# Patient Record
Sex: FEMALE | Race: WHITE | NOT HISPANIC OR LATINO
[De-identification: names, ages, dates, MRNs, and addresses within clinical notes are randomized per-mention and may not be internally consistent; named-entity substitution may affect disease eponyms.]

---

## 2020-01-09 ENCOUNTER — APPOINTMENT (OUTPATIENT)
Dept: CARDIOLOGY | Facility: CLINIC | Age: 75
End: 2020-01-09
Payer: MEDICARE

## 2020-01-09 ENCOUNTER — NON-APPOINTMENT (OUTPATIENT)
Age: 75
End: 2020-01-09

## 2020-01-09 VITALS
SYSTOLIC BLOOD PRESSURE: 169 MMHG | BODY MASS INDEX: 28.34 KG/M2 | HEIGHT: 62 IN | OXYGEN SATURATION: 97 % | WEIGHT: 154 LBS | DIASTOLIC BLOOD PRESSURE: 78 MMHG | HEART RATE: 73 BPM | RESPIRATION RATE: 14 BRPM

## 2020-01-09 DIAGNOSIS — Z83.79 FAMILY HISTORY OF OTHER DISEASES OF THE DIGESTIVE SYSTEM: ICD-10-CM

## 2020-01-09 DIAGNOSIS — Z82.49 FAMILY HISTORY OF ISCHEMIC HEART DISEASE AND OTHER DISEASES OF THE CIRCULATORY SYSTEM: ICD-10-CM

## 2020-01-09 DIAGNOSIS — Z00.00 ENCOUNTER FOR GENERAL ADULT MEDICAL EXAMINATION W/OUT ABNORMAL FINDINGS: ICD-10-CM

## 2020-01-09 DIAGNOSIS — Z78.9 OTHER SPECIFIED HEALTH STATUS: ICD-10-CM

## 2020-01-09 DIAGNOSIS — Z87.891 PERSONAL HISTORY OF NICOTINE DEPENDENCE: ICD-10-CM

## 2020-01-09 DIAGNOSIS — Z80.8 FAMILY HISTORY OF MALIGNANT NEOPLASM OF OTHER ORGANS OR SYSTEMS: ICD-10-CM

## 2020-01-09 PROCEDURE — 36415 COLL VENOUS BLD VENIPUNCTURE: CPT

## 2020-01-09 PROCEDURE — 99205 OFFICE O/P NEW HI 60 MIN: CPT

## 2020-01-09 PROCEDURE — 93000 ELECTROCARDIOGRAM COMPLETE: CPT

## 2020-01-09 RX ORDER — ASPIRIN 81 MG/1
81 TABLET ORAL
Refills: 0 | Status: ACTIVE | COMMUNITY

## 2020-01-12 LAB
CK SERPL-CCNC: 77 U/L
T3 SERPL-MCNC: 94 NG/DL
TSH SERPL-ACNC: 2.42 UIU/ML

## 2020-01-13 NOTE — DISCUSSION/SUMMARY
[FreeTextEntry1] : \par Palpitations -work-up in the remote past demonstrated ventricular premature beats to be the cause.  No ectopy evident on EKG today; we will send her home with a Holter monitor today to better assess her heart rhythm.\par We will arrange for an echocardiogram at her next office visit to arrange for valvular or myocardial abnormality.\par \par CAD, non-occlusive as seen on cardiac CTA at [place] on 12/31/19- 25-50% ostial LAD dz.; No plaque detected in L main, L Circ or RCA.  Will check lipid profile; will have her start ASA 81 mg qd as well as Lipitor.\par \par HLD - to start Lipitor 20 mg qd.\par \par Borderline BP - will monitor for now.\par \par Will return for a visit in 6 weeks; will arrange TTE at that time to assess for hypertensive changes or other structural disease.\par

## 2020-01-13 NOTE — REASON FOR VISIT
[FreeTextEntry1] : \par Zahida Valadez presents today for cardiac evaluation regarding palpitations and recently diagnosed non-occlusive CAD and HDL.

## 2020-01-13 NOTE — PHYSICAL EXAM
[General Appearance - Well Developed] : well developed [Normal Appearance] : normal appearance [Well Groomed] : well groomed [General Appearance - Well Nourished] : well nourished [No Deformities] : no deformities [General Appearance - In No Acute Distress] : no acute distress [Normal Conjunctiva] : the conjunctiva exhibited no abnormalities [Eyelids - No Xanthelasma] : the eyelids demonstrated no xanthelasmas [Normal Jugular Venous A Waves Present] : normal jugular venous A waves present [Normal Jugular Venous V Waves Present] : normal jugular venous V waves present [No Jugular Venous Gama A Waves] : no jugular venous gama A waves [Heart Rate And Rhythm] : heart rate and rhythm were normal [Respiration, Rhythm And Depth] : normal respiratory rhythm and effort [Auscultation Breath Sounds / Voice Sounds] : lungs were clear to auscultation bilaterally [Bowel Sounds] : normal bowel sounds [Abnormal Walk] : normal gait [Nail Clubbing] : no clubbing of the fingernails [Cyanosis, Localized] : no localized cyanosis [Skin Color & Pigmentation] : normal skin color and pigmentation [] : no rash [No Venous Stasis] : no venous stasis [Oriented To Time, Place, And Person] : oriented to person, place, and time [Impaired Insight] : insight and judgment were intact [Affect] : the affect was normal [Mood] : the mood was normal [FreeTextEntry1] : No bruit.  Soft, non-tender.  Liver and spleen not palpable; aortic pulsation not palpable.

## 2020-01-13 NOTE — ASSESSMENT
[FreeTextEntry1] : Palpitations\par \par CAD, non-occlusive.  Cardiac CTA at [place] on 12/31/19 showed 25-50% ostial LAD dz.; No plaque detected in L main, L Circ or RCA\par \par HLD\par \par Borderline BP

## 2020-01-13 NOTE — HISTORY OF PRESENT ILLNESS
[FreeTextEntry1] : She is 75 years old.  She reports the recent onset of palpitations, typically occurring as isolated premature beats, but occasionally occurring in clusters of 4 or 5 beats in a row.  These have been present over the past few weeks. Several years ago, she had similar symptoms, and she tells me that prior work-up showed that isolated ventricular premature beats were the cause.\par \par She was seen in the emergency room at [facility] on December 31 because of her palpitations.  Work-up at that time included a cardiac CT angiogram, which showed a 25-50% ostial LAD lesion but no other coronary plaque.  Calcific changes were also described in the thoracic aorta.  Blood work at that time demonstrated a total cholesterol of 240, with HDL of 83, calculated LDL cholesterol of 142 and triglycerides of 73.  Troponins were normal.  Blood counts and blood chemistries were otherwise unremarkable.  Hemoglobin was 13.7, blood sugar was 99, BUN and creatinine were 16 and 0.7.\par \par She does not describe exertional chest discomfort or dyspnea.  There have been no episodes of lightheadedness or loss of consciousness.\par \par There is no family history of premature heart disease.  She describes no history of diabetes or hypertension.  She has a remote history of cigarette use, which she discontinued in 1978; at the time, she smoked 1 pack/day for perhaps 10 years.

## 2020-01-17 ENCOUNTER — NON-APPOINTMENT (OUTPATIENT)
Age: 75
End: 2020-01-17

## 2020-03-03 ENCOUNTER — APPOINTMENT (OUTPATIENT)
Dept: CARDIOLOGY | Facility: CLINIC | Age: 75
End: 2020-03-03
Payer: MEDICARE

## 2020-03-03 ENCOUNTER — NON-APPOINTMENT (OUTPATIENT)
Age: 75
End: 2020-03-03

## 2020-03-03 VITALS
OXYGEN SATURATION: 100 % | HEART RATE: 51 BPM | SYSTOLIC BLOOD PRESSURE: 118 MMHG | DIASTOLIC BLOOD PRESSURE: 76 MMHG | TEMPERATURE: 98.1 F | WEIGHT: 149 LBS | HEIGHT: 62 IN | RESPIRATION RATE: 17 BRPM | BODY MASS INDEX: 27.42 KG/M2

## 2020-03-03 LAB
ALBUMIN SERPL ELPH-MCNC: 4.4 G/DL
ALP BLD-CCNC: 73 U/L
ALT SERPL-CCNC: 17 U/L
AST SERPL-CCNC: 20 U/L
BILIRUB DIRECT SERPL-MCNC: 0.2 MG/DL
BILIRUB INDIRECT SERPL-MCNC: 0.5 MG/DL
BILIRUB SERPL-MCNC: 0.7 MG/DL
CHOLEST SERPL-MCNC: 144 MG/DL
CHOLEST/HDLC SERPL: 1.7 RATIO
CK SERPL-CCNC: 76 U/L
HDLC SERPL-MCNC: 83 MG/DL
LDLC SERPL CALC-MCNC: 51 MG/DL
LDLC SERPL DIRECT ASSAY-MCNC: 57 MG/DL
PROT SERPL-MCNC: 6.4 G/DL
TRIGL SERPL-MCNC: 44 MG/DL

## 2020-03-03 PROCEDURE — 93000 ELECTROCARDIOGRAM COMPLETE: CPT

## 2020-03-03 PROCEDURE — 99214 OFFICE O/P EST MOD 30 MIN: CPT

## 2020-03-03 PROCEDURE — 36415 COLL VENOUS BLD VENIPUNCTURE: CPT

## 2020-03-03 PROCEDURE — 93306 TTE W/DOPPLER COMPLETE: CPT

## 2020-03-03 NOTE — HISTORY OF PRESENT ILLNESS
[FreeTextEntry1] : She reports recent palpitations, typically as isolated premature beats, but occasionally in clusters.  Years ago she had similar symptoms; prior work-up showed that isolated VPCs as the cause.\par \par She was seen in the ED at [facility] on December 31.  Cardiac CT angiogram showed a 25-50% ostial LAD lesion but no other coronary plaque.  Calcific changes were also described in the thoracic aorta.  Total cholesterol was 240, with HDL of 83, calculated LDL cholesterol of 142 and triglycerides of 73.  Troponins were normal.  Blood counts and blood chemistries were otherwise unremarkable.  Hemoglobin was 13.7, blood sugar was 99, BUN and creatinine were 16 and 0.7.\par \par At her last visit, she wore a Holter monitor.  The recording demonstrated 148 isolated ventricular premature beats, some occurring as ventricular bigeminy.  225 isolated atrial premature beats were seen as well as one atrial couplet and 1 5 beat run of atrial tachycardia.  Her symptoms seem to correlate to the ventricular premature beats and episodes of ventricular bigeminy.\par \par As she returns today, she is feeling better overall.  While she still experiences palpitations, they have been less frequent since I saw her in January.  There have been no episodes of exertional chest discomfort or dyspnea.  She reports no episodes of lightheadedness or loss of consciousness.\par \par At her last visit, we added Lipitor, which she is tolerating without problem.  She is also tolerating low-dose aspirin.

## 2020-03-03 NOTE — DISCUSSION/SUMMARY
[FreeTextEntry1] : \par Palpitations - correlate with isolated VPCs.  Echocardiogram shows no structural cardiac abnormality.  Reassured patient that this does not require therapy.  The only reason to use a medication would be for relief of symptoms, and fortunately her palpitations seem less troublesome at this time.  \par \par CAD, non-occlusive seen on cardiac CTA on 12/31/19 -We will check a lipid profile, to assess her response to atorvastatin, and also will check a hepatic profile and CPK level.  We will call her with results when available.  For the time being, she will continue the current dose of atorvastatin as well as ASA 81 mg qd.\par \par HLD - as above.\par \par BP in normal range on exam today. \par \par Will return for a visit in 4 months.

## 2020-03-03 NOTE — ASSESSMENT
MARYI- pt called and cancelled follow up appointment for tomorrow. She had Procedure: Right carotid Endarterectomy with Pericardial Patch Reconstruction completed on 6/28/19 and is still recovering.  Pt advised to call office back and schedule follow up appoi [FreeTextEntry1] : Palpitations, correlate with isolated VPCs.\par \par CAD, non-occlusive.  Cardiac CTA at [place] on 12/31/19 showed 25-50% ostial LAD dz.; no plaque detected in L main, L Circ or RCA\par \par HLD\par \par Borderline BP

## 2020-04-22 ENCOUNTER — RX RENEWAL (OUTPATIENT)
Age: 75
End: 2020-04-22

## 2020-07-09 ENCOUNTER — NON-APPOINTMENT (OUTPATIENT)
Age: 75
End: 2020-07-09

## 2020-07-09 ENCOUNTER — APPOINTMENT (OUTPATIENT)
Dept: CARDIOLOGY | Facility: CLINIC | Age: 75
End: 2020-07-09
Payer: MEDICARE

## 2020-07-09 VITALS
OXYGEN SATURATION: 100 % | TEMPERATURE: 98 F | DIASTOLIC BLOOD PRESSURE: 78 MMHG | SYSTOLIC BLOOD PRESSURE: 155 MMHG | HEART RATE: 66 BPM | WEIGHT: 145 LBS | HEIGHT: 62 IN | BODY MASS INDEX: 26.68 KG/M2

## 2020-07-09 PROCEDURE — 93000 ELECTROCARDIOGRAM COMPLETE: CPT

## 2020-07-09 PROCEDURE — 99214 OFFICE O/P EST MOD 30 MIN: CPT

## 2020-07-09 PROCEDURE — 36415 COLL VENOUS BLD VENIPUNCTURE: CPT

## 2020-07-09 NOTE — HISTORY OF PRESENT ILLNESS
[FreeTextEntry1] : She has suffered with palpitations, typically as isolated premature beats, but occasionally in clusters.  W/U years ago showed that isolated VPCs correlated with her sxs.\par \par She was seen in the ED at [facility] lst December.  Cardiac CTA showed a 25-50% ostial LAD lesion but no other dz.  Calcific changes were described in the thoracic aorta.  Total cholesterol was 240, with HDL of 83, calculated LDL cholesterol of 142 and triglycerides of 73.  Troponins were normal.  \par \par Earlier this year, a Holter monitor demonstrated 148 isolated ventricular premature beats, some occurring as ventricular bigeminy.  225 isolated atrial premature beats were seen as well as one atrial couplet and 1 5 beat run of atrial tachycardia.  Symptoms again seemed to correlate to the ventricular premature beats and episodes of ventricular bigeminy.\par \par Since I saw her last in early March, she has been well.  She reports only rare palps since her last visit.  She describes no episodes of lightheadedness or loss of consciousness.\par \par At her last visit, we added Lipitor, which she is tolerating without problem.  She is also tolerating low-dose aspirin.

## 2020-07-09 NOTE — ASSESSMENT
[FreeTextEntry1] : Palpitations, correlate with isolated VPCs.\par \par CAD, non-occlusive.  Cardiac CTA at [place] on 12/31/19 showed 25-50% ostial LAD dz.; no plaque detected in L main, L Circ or RCA\par \par HLD\par \par Borderline BP

## 2020-07-09 NOTE — PHYSICAL EXAM
[General Appearance - Well Developed] : well developed [Normal Appearance] : normal appearance [Well Groomed] : well groomed [General Appearance - Well Nourished] : well nourished [No Deformities] : no deformities [General Appearance - In No Acute Distress] : no acute distress [Normal Conjunctiva] : the conjunctiva exhibited no abnormalities [Eyelids - No Xanthelasma] : the eyelids demonstrated no xanthelasmas [Normal Jugular Venous A Waves Present] : normal jugular venous A waves present [Normal Jugular Venous V Waves Present] : normal jugular venous V waves present [No Jugular Venous Gama A Waves] : no jugular venous gama A waves [Respiration, Rhythm And Depth] : normal respiratory rhythm and effort [Auscultation Breath Sounds / Voice Sounds] : lungs were clear to auscultation bilaterally [Heart Rate And Rhythm] : heart rate and rhythm were normal [Bowel Sounds] : normal bowel sounds [Cyanosis, Localized] : no localized cyanosis [Nail Clubbing] : no clubbing of the fingernails [Abnormal Walk] : normal gait [Skin Color & Pigmentation] : normal skin color and pigmentation [No Venous Stasis] : no venous stasis [] : no rash [Impaired Insight] : insight and judgment were intact [Oriented To Time, Place, And Person] : oriented to person, place, and time [Mood] : the mood was normal [Affect] : the affect was normal [FreeTextEntry1] : No bruit.  Soft, non-tender.  Liver and spleen not palpable; aortic pulsation not palpable.

## 2020-07-09 NOTE — DISCUSSION/SUMMARY
[FreeTextEntry1] : \par Palpitations - correlated with isolated VPCs.  Echocardiogram showed no structural cardiac abnormality.   No significant sxs. of late.\par \par CAD, non-occlusive on cardiac CTA 12/2019 at [place] - continue ASA and statin.\par \par HLD - will recheck lipid and hepatic profile  For the time being, she will continue the current dose of atorvastatin, pending result.\par \par Systolic BP higher than normal today (was normal at last O.V.).  She will monitor periodically at home.  Will hold off on adding med at this time.\par \par Low salt and low fat diet. \par \par Will return for a visit in 3 months.

## 2020-07-10 LAB
ALBUMIN SERPL ELPH-MCNC: 4.6 G/DL
ALP BLD-CCNC: 97 U/L
ALT SERPL-CCNC: 39 U/L
AST SERPL-CCNC: 29 U/L
BILIRUB DIRECT SERPL-MCNC: 0.2 MG/DL
BILIRUB INDIRECT SERPL-MCNC: 0.6 MG/DL
BILIRUB SERPL-MCNC: 0.8 MG/DL
CHOLEST SERPL-MCNC: 176 MG/DL
CHOLEST/HDLC SERPL: 1.8 RATIO
HDLC SERPL-MCNC: 98 MG/DL
LDLC SERPL CALC-MCNC: 69 MG/DL
LDLC SERPL DIRECT ASSAY-MCNC: 75 MG/DL
PROT SERPL-MCNC: 6.9 G/DL
TRIGL SERPL-MCNC: 45 MG/DL

## 2020-09-25 ENCOUNTER — NON-APPOINTMENT (OUTPATIENT)
Age: 75
End: 2020-09-25

## 2020-09-25 ENCOUNTER — APPOINTMENT (OUTPATIENT)
Dept: CARDIOLOGY | Facility: CLINIC | Age: 75
End: 2020-09-25
Payer: MEDICARE

## 2020-09-25 VITALS
WEIGHT: 142 LBS | HEART RATE: 77 BPM | DIASTOLIC BLOOD PRESSURE: 88 MMHG | RESPIRATION RATE: 14 BRPM | BODY MASS INDEX: 25.97 KG/M2 | OXYGEN SATURATION: 99 % | SYSTOLIC BLOOD PRESSURE: 158 MMHG

## 2020-09-25 PROCEDURE — 99214 OFFICE O/P EST MOD 30 MIN: CPT

## 2020-09-25 PROCEDURE — 93000 ELECTROCARDIOGRAM COMPLETE: CPT

## 2020-09-25 NOTE — DISCUSSION/SUMMARY
[FreeTextEntry1] : \par Palpitations - correlated with isolated VPCs.  Echocardiogram showed no structural cardiac abnormality.   No significant sxs. of late.\par \par CAD, non-occlusive on cardiac CTA 12/2019 at [place]; remains asx., continue ASA and statin.\par \par HLD - lipid and hepatic profile in good range in July; continue atorvastatin.\par \par Systolic BP remains higher than ideal; appears to have systolic HTN of aging.  Will add olmesartan 5 mg. qd. \par \par Continue low fat and low salt diet.\par \par Ret. in 2 month for OV/BP recheck.

## 2020-09-25 NOTE — ASSESSMENT
[FreeTextEntry1] : Palpitations, correlate with isolated VPCs.\par \par CAD, non-occlusive.  Cardiac CTA at [place] on 12/31/19 showed 25-50% ostial LAD dz.; no plaque detected in L main, L Circ or RCA\par \par HLD\par \par HTN

## 2020-09-25 NOTE — PHYSICAL EXAM
[General Appearance - Well Developed] : well developed [Normal Appearance] : normal appearance [Well Groomed] : well groomed [General Appearance - Well Nourished] : well nourished [No Deformities] : no deformities [General Appearance - In No Acute Distress] : no acute distress [Normal Conjunctiva] : the conjunctiva exhibited no abnormalities [Eyelids - No Xanthelasma] : the eyelids demonstrated no xanthelasmas [Normal Jugular Venous A Waves Present] : normal jugular venous A waves present [Normal Jugular Venous V Waves Present] : normal jugular venous V waves present [No Jugular Venous Gama A Waves] : no jugular venous gama A waves [Respiration, Rhythm And Depth] : normal respiratory rhythm and effort [Auscultation Breath Sounds / Voice Sounds] : lungs were clear to auscultation bilaterally [Heart Rate And Rhythm] : heart rate and rhythm were normal [Bowel Sounds] : normal bowel sounds [Abnormal Walk] : normal gait [Nail Clubbing] : no clubbing of the fingernails [Cyanosis, Localized] : no localized cyanosis [Skin Color & Pigmentation] : normal skin color and pigmentation [] : no rash [No Venous Stasis] : no venous stasis [Oriented To Time, Place, And Person] : oriented to person, place, and time [Impaired Insight] : insight and judgment were intact [Affect] : the affect was normal [Mood] : the mood was normal [FreeTextEntry1] : No bruit.  Soft, non-tender.  Liver and spleen not palpable; aortic pulsation not palpable.

## 2020-09-25 NOTE — REASON FOR VISIT
[FreeTextEntry1] : \par Zahida Valadez returns regarding palpitations, non-occlusive CAD, HLD and borderline BP.

## 2020-09-28 ENCOUNTER — TRANSCRIPTION ENCOUNTER (OUTPATIENT)
Age: 75
End: 2020-09-28

## 2020-11-19 ENCOUNTER — RX RENEWAL (OUTPATIENT)
Age: 75
End: 2020-11-19

## 2020-11-24 ENCOUNTER — APPOINTMENT (OUTPATIENT)
Dept: CARDIOLOGY | Facility: CLINIC | Age: 75
End: 2020-11-24
Payer: MEDICARE

## 2020-11-24 ENCOUNTER — NON-APPOINTMENT (OUTPATIENT)
Age: 75
End: 2020-11-24

## 2020-11-24 VITALS
SYSTOLIC BLOOD PRESSURE: 140 MMHG | HEART RATE: 59 BPM | HEIGHT: 62 IN | TEMPERATURE: 98.1 F | DIASTOLIC BLOOD PRESSURE: 80 MMHG | RESPIRATION RATE: 14 BRPM | WEIGHT: 141 LBS | BODY MASS INDEX: 25.95 KG/M2

## 2020-11-24 LAB
ALBUMIN SERPL ELPH-MCNC: 4.4 G/DL
ALP BLD-CCNC: 80 U/L
ALT SERPL-CCNC: 16 U/L
ANION GAP SERPL CALC-SCNC: 11 MMOL/L
AST SERPL-CCNC: 18 U/L
BILIRUB SERPL-MCNC: 0.6 MG/DL
BUN SERPL-MCNC: 19 MG/DL
CALCIUM SERPL-MCNC: 9.2 MG/DL
CHLORIDE SERPL-SCNC: 104 MMOL/L
CHOLEST SERPL-MCNC: 171 MG/DL
CO2 SERPL-SCNC: 28 MMOL/L
CREAT SERPL-MCNC: 0.84 MG/DL
GLUCOSE SERPL-MCNC: 101 MG/DL
HDLC SERPL-MCNC: 98 MG/DL
LDLC SERPL CALC-MCNC: 63 MG/DL
LDLC SERPL DIRECT ASSAY-MCNC: 64 MG/DL
NONHDLC SERPL-MCNC: 72 MG/DL
POTASSIUM SERPL-SCNC: 4.4 MMOL/L
PROT SERPL-MCNC: 6.6 G/DL
SODIUM SERPL-SCNC: 143 MMOL/L
TRIGL SERPL-MCNC: 48 MG/DL

## 2020-11-24 PROCEDURE — 99214 OFFICE O/P EST MOD 30 MIN: CPT

## 2020-11-24 PROCEDURE — 93000 ELECTROCARDIOGRAM COMPLETE: CPT

## 2020-11-24 PROCEDURE — 36415 COLL VENOUS BLD VENIPUNCTURE: CPT

## 2020-11-24 NOTE — ASSESSMENT
[FreeTextEntry1] : Palpitations, correlate with isolated VPCs.\par \par CAD, non-occlusive.  Cardiac CTA at [place] on 12/31/19 showed 25-50% ostial LAD dz.; no plaque detected in L main, L Circ or RCA\par \par HLD\par \par HTN

## 2020-11-24 NOTE — DISCUSSION/SUMMARY
[FreeTextEntry1] : \par Systolic BP improved - continue olmesartan 5 mg. qd. \par \par Palpitations - correlate with isolated VPCs.  Echo showed no structural cardiac abnormality.   No significant sxs. of late.\par \par CAD, non-occlusive on cardiac CTA 12/2019 at [place]; remains asx., continue ASA and statin.\par \par HLD - lipid and hepatic profile in good range in July; continue atorvastatin.\par \par Continue low fat and low salt diet.\par \par Will check CMP and lipid panel.\par \par Ret. in 4 months for OV.

## 2020-11-24 NOTE — HISTORY OF PRESENT ILLNESS
[FreeTextEntry1] : She has suffered with palpitations for years.   When seen in the ED at [place] last December, cardiac CTA showed a 25-50% ostial LAD lesion but no other dz.  Calcific changes were described in the thoracic aorta.  Troponins were normal.  \par \par Earlier this year, a Holter monitor demonstrated 148 isolated ventricular premature beats, some as ventricular bigeminy.  225 isolated atrial premature beats were seen as well as one atrial couplet and one 5 beat run of atrial tachycardia.  Symptoms seemed to correlate to VPCs and  ventricular bigeminy.\par \par Since I saw her last in Sept., she remains well.  She reports no significant palps since her last visit.  She reports no episodes of lightheadedness or loss of consciousness. There have been no episodes of CP or BOLAND.\par \par She continues Lipitor which she is tolerating without problem as well as low-dose aspirin.  She is tolerating the recent addition of olmesartan without problem.

## 2020-12-23 ENCOUNTER — RX RENEWAL (OUTPATIENT)
Age: 75
End: 2020-12-23

## 2021-03-30 ENCOUNTER — NON-APPOINTMENT (OUTPATIENT)
Age: 76
End: 2021-03-30

## 2021-03-30 ENCOUNTER — APPOINTMENT (OUTPATIENT)
Dept: CARDIOLOGY | Facility: CLINIC | Age: 76
End: 2021-03-30
Payer: MEDICARE

## 2021-03-30 VITALS
WEIGHT: 142 LBS | OXYGEN SATURATION: 99 % | BODY MASS INDEX: 25.97 KG/M2 | HEART RATE: 60 BPM | DIASTOLIC BLOOD PRESSURE: 70 MMHG | SYSTOLIC BLOOD PRESSURE: 140 MMHG | TEMPERATURE: 97 F

## 2021-03-30 PROCEDURE — 99214 OFFICE O/P EST MOD 30 MIN: CPT

## 2021-03-30 PROCEDURE — 93000 ELECTROCARDIOGRAM COMPLETE: CPT

## 2021-03-30 PROCEDURE — 99072 ADDL SUPL MATRL&STAF TM PHE: CPT

## 2021-03-30 PROCEDURE — 36415 COLL VENOUS BLD VENIPUNCTURE: CPT

## 2021-03-30 NOTE — PHYSICAL EXAM
[General Appearance - Well Developed] : well developed [Normal Appearance] : normal appearance [Well Groomed] : well groomed [General Appearance - Well Nourished] : well nourished [No Deformities] : no deformities [General Appearance - In No Acute Distress] : no acute distress [Normal Conjunctiva] : the conjunctiva exhibited no abnormalities [Eyelids - No Xanthelasma] : the eyelids demonstrated no xanthelasmas [Normal Jugular Venous A Waves Present] : normal jugular venous A waves present [Normal Jugular Venous V Waves Present] : normal jugular venous V waves present [No Jugular Venous Gama A Waves] : no jugular venous gama A waves [Auscultation Breath Sounds / Voice Sounds] : lungs were clear to auscultation bilaterally [Respiration, Rhythm And Depth] : normal respiratory rhythm and effort [Heart Rate And Rhythm] : heart rate and rhythm were normal [Bowel Sounds] : normal bowel sounds [Abnormal Walk] : normal gait [Nail Clubbing] : no clubbing of the fingernails [Cyanosis, Localized] : no localized cyanosis [Skin Color & Pigmentation] : normal skin color and pigmentation [] : no rash [No Venous Stasis] : no venous stasis [Oriented To Time, Place, And Person] : oriented to person, place, and time [Impaired Insight] : insight and judgment were intact [Affect] : the affect was normal [Mood] : the mood was normal [FreeTextEntry1] : No bruit.  Soft, non-tender.  Liver and spleen not palpable; aortic pulsation not palpable.

## 2021-03-30 NOTE — DISCUSSION/SUMMARY
[FreeTextEntry1] : \par Systolic BP higher than idea; will increase olmesartan to 10 mg. qd. (two 5 mg tabs)\par \par HLD - continue atorvastatin. Will recheck LFTs and lipid panel.\par \par CAD, non-occlusive on cardiac CTA 12/2019 at [place]; remains asx., continue ASA and statin.\par \par Palpitations - correlate with isolated VPCs; echo showed no structural cardiac abnormality.   No significant sxs. of late.\par \par Continue low fat and low salt diet.\par \par Ret. in 4 months for OV.

## 2021-03-30 NOTE — ASSESSMENT
[FreeTextEntry1] : Palpitations, correlate with isolated VPCs.\par \par CAD, non-occlusive.  Cardiac CTA at [place] on 12/31/19 showed 25-50% ostial LAD dz.; no plaque detected in L main, L Circ or RCA\par \par HLD\par \par HTN

## 2021-03-30 NOTE — REASON FOR VISIT
[FreeTextEntry1] : \par Zahida Valadez returns regarding palpitations, non-occlusive CAD, HLD and HTN.

## 2021-03-30 NOTE — HISTORY OF PRESENT ILLNESS
[FreeTextEntry1] : She has suffered with palpitations for years.   When seen in the ED at [place] last December, cardiac CTA showed a 25-50% ostial LAD lesion but no other dz.  Calcific changes were described in the thoracic aorta.  Troponins were normal.  \par \par Earlier this year, a Holter monitor demonstrated 148 isolated ventricular premature beats, some as ventricular bigeminy.  225 isolated atrial premature beats were seen as well as one atrial couplet and one 5 beat run of atrial tachycardia.  Symptoms seemed to correlate to VPCs and  ventricular bigeminy.\par \par Since I saw her last in November, she has remained well.  There have been no significant palps since.  She describes no episodes of lightheadedness or loss of consciousness. There have been no episodes of CP or BOLAND.\par \par She is tolerating her meds without problem. There have been no new interval medical problems. She has received both Covid vaccinations.

## 2021-03-31 LAB
ALBUMIN SERPL ELPH-MCNC: 4.4 G/DL
ALP BLD-CCNC: 74 U/L
ALT SERPL-CCNC: 12 U/L
ANION GAP SERPL CALC-SCNC: 11 MMOL/L
AST SERPL-CCNC: 15 U/L
BILIRUB SERPL-MCNC: 0.6 MG/DL
BUN SERPL-MCNC: 16 MG/DL
CALCIUM SERPL-MCNC: 9.1 MG/DL
CHLORIDE SERPL-SCNC: 103 MMOL/L
CHOLEST SERPL-MCNC: 187 MG/DL
CO2 SERPL-SCNC: 27 MMOL/L
CREAT SERPL-MCNC: 0.8 MG/DL
GLUCOSE SERPL-MCNC: 93 MG/DL
HDLC SERPL-MCNC: 92 MG/DL
LDLC SERPL CALC-MCNC: 81 MG/DL
LDLC SERPL DIRECT ASSAY-MCNC: 81 MG/DL
NONHDLC SERPL-MCNC: 94 MG/DL
POTASSIUM SERPL-SCNC: 4.3 MMOL/L
PROT SERPL-MCNC: 6.5 G/DL
SODIUM SERPL-SCNC: 141 MMOL/L
TRIGL SERPL-MCNC: 68 MG/DL

## 2021-07-01 ENCOUNTER — RX RENEWAL (OUTPATIENT)
Age: 76
End: 2021-07-01

## 2021-07-27 ENCOUNTER — NON-APPOINTMENT (OUTPATIENT)
Age: 76
End: 2021-07-27

## 2021-07-27 ENCOUNTER — APPOINTMENT (OUTPATIENT)
Dept: CARDIOLOGY | Facility: CLINIC | Age: 76
End: 2021-07-27
Payer: MEDICARE

## 2021-07-27 VITALS
HEART RATE: 60 BPM | SYSTOLIC BLOOD PRESSURE: 132 MMHG | WEIGHT: 142 LBS | OXYGEN SATURATION: 99 % | DIASTOLIC BLOOD PRESSURE: 78 MMHG | BODY MASS INDEX: 26.13 KG/M2 | HEIGHT: 62 IN

## 2021-07-27 PROCEDURE — 93000 ELECTROCARDIOGRAM COMPLETE: CPT

## 2021-07-27 PROCEDURE — 99072 ADDL SUPL MATRL&STAF TM PHE: CPT

## 2021-07-27 PROCEDURE — 99214 OFFICE O/P EST MOD 30 MIN: CPT

## 2021-07-27 NOTE — DISCUSSION/SUMMARY
[FreeTextEntry1] : \par BP in acceptable range today; continue olmesartan 10 mg. qd. (two 5 mg tabs)\par \par HLD - continue atorvastatin. Sent new Rx to drug store.\par \par CAD, non-occlusive on cardiac CTA 12/2019 at [place].  Remains asx., continue ASA and statin.\par \par Palpitations - correlated with isolated VPCs; echo showed no structural cardiac abnormality.   No significant sxs. of late.\par \par Continue low fat and low salt diet.\par \par Ret. in 6 months for OV.

## 2021-07-27 NOTE — HISTORY OF PRESENT ILLNESS
[FreeTextEntry1] : She has suffered with palpitations for years.   In Dec. 2019, she went to the ED at [place] because of palps;  cardiac CTA showed a 25-50% ostial LAD lesion but no other dz.  Calcific changes were described in the thoracic aorta.  Troponins were normal.  Subsequent Holter monitor in early 2020 demonstrated 148 isolated ventricular premature beats, some as ventricular bigeminy.  225 isolated atrial premature beats were seen as well as one atrial couplet and one 5 beat run of atrial tachycardia.  Symptoms seemed to correlate to VPCs and  ventricular bigeminy.\par \par When I saw her last, I had her increase the dose of Benicar for better BP control.\par \par As she returns today, she has been feeling well.  She reports no significant palps since I saw her last.  There have been no episodes of lightheadedness or loss of consciousness. She describes no episodes of CP or BOLAND.\par \par She continues to tolerated her meds without problem. There have been no new interval medical problems.

## 2021-07-27 NOTE — ASSESSMENT
[FreeTextEntry1] : Palpitations, correlate with isolated VPCs.\par \par CAD, non-occlusive.  Cardiac CTA at [place] on 12/31/19 showed 25-50% ostial LAD dz.; no plaque detected in L main, L Circ or RCA\par \par HLD\par \par HTN

## 2022-01-12 ENCOUNTER — RX RENEWAL (OUTPATIENT)
Age: 77
End: 2022-01-12

## 2022-02-01 ENCOUNTER — APPOINTMENT (OUTPATIENT)
Dept: CARDIOLOGY | Facility: CLINIC | Age: 77
End: 2022-02-01
Payer: COMMERCIAL

## 2022-02-01 ENCOUNTER — NON-APPOINTMENT (OUTPATIENT)
Age: 77
End: 2022-02-01

## 2022-02-01 VITALS
HEART RATE: 74 BPM | DIASTOLIC BLOOD PRESSURE: 80 MMHG | HEIGHT: 62 IN | OXYGEN SATURATION: 98 % | WEIGHT: 144 LBS | BODY MASS INDEX: 26.5 KG/M2 | SYSTOLIC BLOOD PRESSURE: 122 MMHG

## 2022-02-01 PROCEDURE — 36415 COLL VENOUS BLD VENIPUNCTURE: CPT

## 2022-02-01 PROCEDURE — 93000 ELECTROCARDIOGRAM COMPLETE: CPT

## 2022-02-01 PROCEDURE — 99214 OFFICE O/P EST MOD 30 MIN: CPT

## 2022-02-01 NOTE — ASSESSMENT
[FreeTextEntry1] : Palpitations, correlate with isolated VPCs.\par \par CAD, non-occlusive.  Cardiac CTA at [place] on 12/31/19 showed 25-50% ostial LAD dz.; no plaque detected in L main, L Circ or RCA\par \par HLD\par \par HTN

## 2022-02-01 NOTE — HISTORY OF PRESENT ILLNESS
[FreeTextEntry1] : She has suffered with palpitations for years.   In Dec. 2019, she went to the ED at [place] because of palps;  cardiac CTA showed a 25-50% ostial LAD lesion but no other dz.  Calcific changes were described in the thoracic aorta.  Troponins were normal.  Subsequent Holter monitor in early 2020 demonstrated 148 isolated ventricular premature beats, some as ventricular bigeminy.  225 isolated atrial premature beats were seen as well as one atrial couplet and one 5 beat run of atrial tachycardia.  Symptoms seemed to correlate to VPCs and  ventricular bigeminy.\par \par As she returns today, she remains well.  She is active and exercises regularly at a gym.  She reports no recurrence or palps since her last visit.  She describes no episodes of CP or BOLAND, and no lightheadedness or loss of consciousness.\par \par She continues to tolerated her meds without problem. There have been no new interval medical problems.

## 2022-02-01 NOTE — DISCUSSION/SUMMARY
[FreeTextEntry1] : BP remains in normal range today; continue olmesartan 10 mg. qd. (two 5 mg tabs)\par \par HLD - continue atorvastatin. \par \par CAD, non-occlusive on cardiac CTA 12/2019 at [place].  Remains asx., continue ASA and statin.\par \par Palpitations - correlated with isolated VPCs; echo showed no structural cardiac abnormality.   No significant sxs. of late.\par \par Will check blood work today; she request that we check Covid Ab levels.\par \par Continue low fat and low salt diet.\par \par Ret. in 6 months for OV.  Will arrange TTE at that time.

## 2022-02-03 LAB
ALBUMIN SERPL ELPH-MCNC: 4.4 G/DL
ALP BLD-CCNC: 67 U/L
ALT SERPL-CCNC: 16 U/L
ANION GAP SERPL CALC-SCNC: 13 MMOL/L
AST SERPL-CCNC: 19 U/L
BASOPHILS # BLD AUTO: 0.05 K/UL
BASOPHILS NFR BLD AUTO: 1 %
BILIRUB SERPL-MCNC: 0.6 MG/DL
BUN SERPL-MCNC: 15 MG/DL
CALCIUM SERPL-MCNC: 9.5 MG/DL
CHLORIDE SERPL-SCNC: 105 MMOL/L
CHOLEST SERPL-MCNC: 186 MG/DL
CK SERPL-CCNC: 90 U/L
CO2 SERPL-SCNC: 25 MMOL/L
COVID-19 NUCLEOCAPSID  GAM ANTIBODY INTERPRETATION: NEGATIVE
COVID-19 SPIKE DOMAIN ANTIBODY INTERPRETATION: POSITIVE
CREAT SERPL-MCNC: 0.8 MG/DL
EOSINOPHIL # BLD AUTO: 0.16 K/UL
EOSINOPHIL NFR BLD AUTO: 3.1 %
GLUCOSE SERPL-MCNC: 97 MG/DL
HCT VFR BLD CALC: 41.3 %
HDLC SERPL-MCNC: 93 MG/DL
HGB BLD-MCNC: 13.2 G/DL
IMM GRANULOCYTES NFR BLD AUTO: 0.2 %
LDLC SERPL CALC-MCNC: 84 MG/DL
LDLC SERPL DIRECT ASSAY-MCNC: 82 MG/DL
LYMPHOCYTES # BLD AUTO: 1.75 K/UL
LYMPHOCYTES NFR BLD AUTO: 33.7 %
MAN DIFF?: NORMAL
MCHC RBC-ENTMCNC: 29.7 PG
MCHC RBC-ENTMCNC: 32 GM/DL
MCV RBC AUTO: 93 FL
MONOCYTES # BLD AUTO: 0.52 K/UL
MONOCYTES NFR BLD AUTO: 10 %
NEUTROPHILS # BLD AUTO: 2.7 K/UL
NEUTROPHILS NFR BLD AUTO: 52 %
NONHDLC SERPL-MCNC: 93 MG/DL
PLATELET # BLD AUTO: 201 K/UL
POTASSIUM SERPL-SCNC: 5 MMOL/L
PROT SERPL-MCNC: 6.5 G/DL
RBC # BLD: 4.44 M/UL
RBC # FLD: 13.2 %
SARS-COV-2 AB SERPL IA-ACNC: 233 U/ML
SARS-COV-2 AB SERPL QL IA: 0.07 INDEX
SODIUM SERPL-SCNC: 143 MMOL/L
TRIGL SERPL-MCNC: 46 MG/DL
WBC # FLD AUTO: 5.19 K/UL

## 2022-03-04 ENCOUNTER — TRANSCRIPTION ENCOUNTER (OUTPATIENT)
Age: 77
End: 2022-03-04

## 2022-03-04 ENCOUNTER — RX RENEWAL (OUTPATIENT)
Age: 77
End: 2022-03-04

## 2022-08-02 ENCOUNTER — APPOINTMENT (OUTPATIENT)
Dept: CARDIOLOGY | Facility: CLINIC | Age: 77
End: 2022-08-02

## 2022-08-02 NOTE — REASON FOR VISIT
[FreeTextEntry1] : PATIENT CANCELLED APPT. \par \par \par PRELIMINARY NOTE\par \par \par Zahida Valadez returns regarding palpitations, non-occlusive CAD, HLD and HTN.

## 2022-08-02 NOTE — ASSESSMENT
[FreeTextEntry1] : Palpitations, correlate with isolated VPCs.\par \par CAD, non-occlusive.  Cardiac CTA at [place] on 12/31/19 showed 25-50% ostial LAD dz.; no plaque detected in L main, L Circ or RCA\par \par HLD\par \par HTN

## 2022-08-02 NOTE — HISTORY OF PRESENT ILLNESS
[FreeTextEntry1] : She has suffered with palpitations for years.   In Dec. 2019, she went to the ED at [place] because of palps;  cardiac CTA showed a 25-50% ostial LAD lesion but no other dz.  Calcific changes were described in the thoracic aorta.  Troponins were normal.  Subsequent Holter monitor in early 2020 demonstrated 148 isolated ventricular premature beats, some as ventricular bigeminy.  225 isolated atrial premature beats were seen as well as one atrial couplet and one 5 beat run of atrial tachycardia.  Symptoms seemed to correlate to VPCs and  ventricular bigeminy.\par \par As she returns today,\par \par  she remains well.  She is active and exercises regularly at a gym.  She reports no recurrence or palps since her last visit.  She describes no episodes of CP or BOLAND, and no lightheadedness or loss of consciousness.\par \par She continues to tolerated her meds without problem. There have been no new interval medical problems.

## 2022-08-02 NOTE — DISCUSSION/SUMMARY
[EKG obtained to assist in diagnosis and management of assessed problem(s)] : EKG obtained to assist in diagnosis and management of assessed problem(s) [FreeTextEntry1] : BP remains in normal range today; continue olmesartan 10 mg. qd. (two 5 mg tabs)\par \par HLD - continue atorvastatin. \par \par CAD, non-occlusive on cardiac CTA 12/2019 at [place].  Remains asx., continue ASA and statin.\par \par Palpitations - correlated with isolated VPCs; echo showed no structural cardiac abnormality.   No significant sxs. of late.\par \par Continue low fat and low salt diet.\par \par minutes spent on today's office visit. \par \par Ret. in 6 months for OV.  Will arrange TTE at that time.

## 2022-08-02 NOTE — PHYSICAL EXAM
[General Appearance - Well Developed] : well developed [Normal Appearance] : normal appearance [Well Groomed] : well groomed [General Appearance - Well Nourished] : well nourished [No Deformities] : no deformities [General Appearance - In No Acute Distress] : no acute distress [Normal Conjunctiva] : the conjunctiva exhibited no abnormalities [Eyelids - No Xanthelasma] : the eyelids demonstrated no xanthelasmas [Normal Jugular Venous A Waves Present] : normal jugular venous A waves present [Normal Jugular Venous V Waves Present] : normal jugular venous V waves present [No Jugular Venous Gama A Waves] : no jugular venous gama A waves [Respiration, Rhythm And Depth] : normal respiratory rhythm and effort [Auscultation Breath Sounds / Voice Sounds] : lungs were clear to auscultation bilaterally [Heart Rate And Rhythm] : heart rate and rhythm were normal [Bowel Sounds] : normal bowel sounds [Abnormal Walk] : normal gait [Nail Clubbing] : no clubbing of the fingernails [Cyanosis, Localized] : no localized cyanosis [] : no rash [Skin Color & Pigmentation] : normal skin color and pigmentation [No Venous Stasis] : no venous stasis [Oriented To Time, Place, And Person] : oriented to person, place, and time [Impaired Insight] : insight and judgment were intact [Affect] : the affect was normal [Mood] : the mood was normal [FreeTextEntry1] : No bruit.  Soft, non-tender.  Liver and spleen not palpable; aortic pulsation not palpable.

## 2022-09-06 ENCOUNTER — EMERGENCY (EMERGENCY)
Facility: HOSPITAL | Age: 77
LOS: 0 days | Discharge: HOME | End: 2022-09-06
Attending: EMERGENCY MEDICINE

## 2022-09-06 VITALS
SYSTOLIC BLOOD PRESSURE: 162 MMHG | TEMPERATURE: 98 F | HEART RATE: 77 BPM | RESPIRATION RATE: 18 BRPM | OXYGEN SATURATION: 100 % | DIASTOLIC BLOOD PRESSURE: 83 MMHG | WEIGHT: 139.99 LBS

## 2022-09-06 VITALS
HEART RATE: 78 BPM | OXYGEN SATURATION: 100 % | DIASTOLIC BLOOD PRESSURE: 82 MMHG | RESPIRATION RATE: 17 BRPM | TEMPERATURE: 98 F | SYSTOLIC BLOOD PRESSURE: 144 MMHG

## 2022-09-06 DIAGNOSIS — I10 ESSENTIAL (PRIMARY) HYPERTENSION: ICD-10-CM
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
## 2022-09-06 DIAGNOSIS — M71.22 SYNOVIAL CYST OF POPLITEAL SPACE [BAKER], LEFT KNEE: ICD-10-CM
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
## 2022-09-06 DIAGNOSIS — E78.5 HYPERLIPIDEMIA, UNSPECIFIED: ICD-10-CM
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
## 2022-09-06 DIAGNOSIS — M25.562 PAIN IN LEFT KNEE: ICD-10-CM

## 2022-09-06 PROCEDURE — 93971 EXTREMITY STUDY: CPT | Mod: 26

## 2022-09-06 PROCEDURE — 99284 EMERGENCY DEPT VISIT MOD MDM: CPT

## 2022-09-06 RX ORDER — ATORVASTATIN CALCIUM 80 MG/1
1 TABLET, FILM COATED ORAL
Qty: 0 | Refills: 0 | DISCHARGE

## 2022-09-06 RX ORDER — OMEPRAZOLE 10 MG/1
1 CAPSULE, DELAYED RELEASE ORAL
Qty: 0 | Refills: 0 | DISCHARGE

## 2022-09-06 RX ORDER — OLMESARTAN MEDOXOMIL 5 MG/1
1 TABLET, FILM COATED ORAL
Qty: 0 | Refills: 0 | DISCHARGE

## 2022-09-06 RX ORDER — IBUPROFEN 200 MG
1 TABLET ORAL
Qty: 20 | Refills: 0
Start: 2022-09-06 | End: 2022-09-10

## 2022-09-06 NOTE — ED PROVIDER NOTE - OBJECTIVE STATEMENT
78 yo F w/ PMH HTN and HLD presents to ED for L knee pain x1.5 months, worsening the last couple days, Pt was helping her son move and was climbing up and down stairs. Denies falls, h/o knee surgery, DVT, and calf pain. Pain centralized around posterior aspect of L knee. Pt took ibuprofen w/ improvement.

## 2022-09-06 NOTE — ED PROVIDER NOTE - CLINICAL SUMMARY MEDICAL DECISION MAKING FREE TEXT BOX
DDx: inflammation due to overuse. R/o DVT  Plan: US. Pt declines pain control. Will reassess and discharge with ortho follow up.

## 2022-09-06 NOTE — ED ADULT NURSE NOTE - OBJECTIVE STATEMENT
Patient alert and oriented X 4, states about a month and a half ago she was at the gym on the treadmill when she noticed some pain in the back of her left knee, states it has been on going since then but not as bad. Yesterday her pain was aggravated by going up and down the stairs while helping her son move. Rates pain 6/10 denies injuries, noted with slight swelling on the knee. States pain is now shooting down the leg, and worsens with movement.

## 2022-09-06 NOTE — ED PROVIDER NOTE - PATIENT PORTAL LINK FT
You can access the FollowMyHealth Patient Portal offered by [facility] by registering at the following website: [email or web address]. By joining [employer]’s FollowMyHealth portal, you will also be able to view your health information using other applications (apps) compatible with our system.

## 2022-09-06 NOTE — ED PROVIDER NOTE - PROGRESS NOTE DETAILS
Pt is feeling well, US shows cyst. Pt informed of results, has ortho f/u, and is able to ambulate, and ready for d/c.

## 2022-09-06 NOTE — ED ADULT TRIAGE NOTE - CHIEF COMPLAINT QUOTE
pt a&O x4 pt c.o of left knee pain takes Motrin daily ongoing x months. no known injuries states walked up a lot of stairs yesterday which could of aggregated it.

## 2022-09-06 NOTE — ED PROVIDER NOTE - CARE PROVIDER_API CALL
Akshat Patel (DO)  Orthopaedic Surgery  [address] [place] [address]  Phone: [phone]  Fax: [phone]  Follow Up Time: 4-6 Days

## 2022-09-12 ENCOUNTER — APPOINTMENT (OUTPATIENT)
Dept: ORTHOPEDIC SURGERY | Facility: CLINIC | Age: 77
End: 2022-09-12

## 2022-09-12 VITALS — HEIGHT: 62 IN | WEIGHT: 144 LBS | BODY MASS INDEX: 26.5 KG/M2

## 2022-09-12 DIAGNOSIS — M65.9 SYNOVITIS AND TENOSYNOVITIS, UNSPECIFIED: ICD-10-CM

## 2022-09-12 DIAGNOSIS — M17.12 UNILATERAL PRIMARY OSTEOARTHRITIS, LEFT KNEE: ICD-10-CM

## 2022-09-12 DIAGNOSIS — I10 ESSENTIAL (PRIMARY) HYPERTENSION: ICD-10-CM

## 2022-09-12 DIAGNOSIS — E78.00 PURE HYPERCHOLESTEROLEMIA, UNSPECIFIED: ICD-10-CM

## 2022-09-12 PROBLEM — E78.5 HYPERLIPIDEMIA, UNSPECIFIED: Chronic | Status: ACTIVE | Noted: 2022-09-06

## 2022-09-12 PROCEDURE — J3490M: CUSTOM

## 2022-09-12 PROCEDURE — 99203 OFFICE O/P NEW LOW 30 MIN: CPT | Mod: 25

## 2022-09-12 PROCEDURE — 20611 DRAIN/INJ JOINT/BURSA W/US: CPT | Mod: LT

## 2022-09-12 PROCEDURE — 73564 X-RAY EXAM KNEE 4 OR MORE: CPT | Mod: LT

## 2022-09-12 NOTE — DISCUSSION/SUMMARY
[de-identified] : General Dx Discussion\par The patient was advised of the diagnosis. The natural history of the pathology was explained in full to the patient in layman's terms. All questions were answered. The risks and benefits of surgical and non-surgical treatment alternatives were explained in full to the patient.\par \par will try a cortisone injection f/u 5-6 weeks  WDL

## 2022-09-12 NOTE — PHYSICAL EXAM
[Left] : left knee [NL (0)] : extension 0 degrees [5___] : hamstring 5[unfilled]/5 [Negative] : negative Silvana's [] : patient ambulates without assistive device [TWNoteComboBox7] : flexion 110 degrees

## 2022-09-12 NOTE — HISTORY OF PRESENT ILLNESS
[6] : 6 [3] : 3 [Dull/Aching] : dull/aching [Radiating] : radiating [Shooting] : shooting [Intermittent] : intermittent [] : no [FreeTextEntry1] : left knee [FreeTextEntry5] : No known injury, pain started occurring gradually. [FreeTextEntry7] : left shin

## 2022-09-12 NOTE — PROCEDURE
[Large Joint Injection] : Large joint injection [Left] : of the left [Knee] : knee [Pain] : pain [Inflammation] : inflammation [X-ray evidence of Osteoarthritis on this or prior visit] : x-ray evidence of Osteoarthritis on this or prior visit [Alcohol] : alcohol [Betadine] : betadine [Ethyl Chloride sprayed topically] : ethyl chloride sprayed topically [Sterile technique used] : sterile technique used [___ cc    3mg] :  Betamethasone (Celestone) ~Vcc of 3mg [___ cc    1%] : Lidocaine ~Vcc of 1%  [___ cc    0.25%] : Bupivacaine (Marcaine) ~Vcc of 0.25%  [] : Patient tolerated procedure well [Call if redness, pain or fever occur] : call if redness, pain or fever occur [Apply ice for 15min out of every hour for the next 12-24 hours as tolerated] : apply ice for 15 minutes out of every hour for the next 12-24 hours as tolerated [Previous OTC use and PT nontherapeutic] : patient has tried OTC's including aspirin, Ibuprofen, Aleve, etc or prescription NSAIDS, and/or exercises at home and/or physical therapy without satisfactory response [Patient had decreased mobility in the joint] : patient had decreased mobility in the joint [Risks, benefits, alternatives discussed / Verbal consent obtained] : the risks benefits, and alternatives have been discussed, and verbal consent was obtained [Prior failure or difficult injection] : prior failure or difficult injection [All ultrasound images have been permanently captured and stored accordingly in our picture archiving and communication system] : All ultrasound images have been permanently captured and stored accordingly in our picture archiving and communication system [Visualization of the needle and placement of injection was performed without complication] : visualization of the needle and placement of injection was performed without complication

## 2022-09-13 ENCOUNTER — EMERGENCY (EMERGENCY)
Facility: HOSPITAL | Age: 77
LOS: 0 days | Discharge: HOME | End: 2022-09-13
Attending: STUDENT IN AN ORGANIZED HEALTH CARE EDUCATION/TRAINING PROGRAM

## 2022-09-13 VITALS
SYSTOLIC BLOOD PRESSURE: 131 MMHG | DIASTOLIC BLOOD PRESSURE: 79 MMHG | RESPIRATION RATE: 16 BRPM | TEMPERATURE: 97 F | OXYGEN SATURATION: 98 % | HEART RATE: 78 BPM | HEIGHT: 62 IN | WEIGHT: 139.99 LBS

## 2022-09-13 VITALS
OXYGEN SATURATION: 97 % | HEART RATE: 72 BPM | DIASTOLIC BLOOD PRESSURE: 56 MMHG | TEMPERATURE: 98 F | SYSTOLIC BLOOD PRESSURE: 104 MMHG | RESPIRATION RATE: 18 BRPM

## 2022-09-13 DIAGNOSIS — D72.829 ELEVATED WHITE BLOOD CELL COUNT, UNSPECIFIED: ICD-10-CM

## 2022-09-13 DIAGNOSIS — M71.22 SYNOVIAL CYST OF POPLITEAL SPACE [BAKER], LEFT KNEE: ICD-10-CM

## 2022-09-13 DIAGNOSIS — U07.1 COVID-19: ICD-10-CM

## 2022-09-13 DIAGNOSIS — R07.2 PRECORDIAL PAIN: ICD-10-CM

## 2022-09-13 DIAGNOSIS — K21.9 GASTRO-ESOPHAGEAL REFLUX DISEASE WITHOUT ESOPHAGITIS: ICD-10-CM

## 2022-09-13 DIAGNOSIS — R53.83 OTHER FATIGUE: ICD-10-CM

## 2022-09-13 DIAGNOSIS — R63.0 ANOREXIA: ICD-10-CM

## 2022-09-13 DIAGNOSIS — E78.5 HYPERLIPIDEMIA, UNSPECIFIED: ICD-10-CM

## 2022-09-13 DIAGNOSIS — I25.10 ATHEROSCLEROTIC HEART DISEASE OF NATIVE CORONARY ARTERY WITHOUT ANGINA PECTORIS: ICD-10-CM

## 2022-09-13 LAB
ALBUMIN SERPL ELPH-MCNC: 3.8 G/DL — SIGNIFICANT CHANGE UP (ref 3.3–5)
ALP SERPL-CCNC: 71 U/L — SIGNIFICANT CHANGE UP (ref 40–120)
ALT FLD-CCNC: 26 U/L — SIGNIFICANT CHANGE UP (ref 12–78)
ANION GAP SERPL CALC-SCNC: 6 MMOL/L — SIGNIFICANT CHANGE UP (ref 5–17)
AST SERPL-CCNC: 19 U/L — SIGNIFICANT CHANGE UP (ref 15–37)
BASOPHILS # BLD AUTO: 0.02 K/UL — SIGNIFICANT CHANGE UP (ref 0–0.2)
BASOPHILS NFR BLD AUTO: 0.2 % — SIGNIFICANT CHANGE UP (ref 0–2)
BILIRUB SERPL-MCNC: 0.5 MG/DL — SIGNIFICANT CHANGE UP (ref 0.2–1.2)
BUN SERPL-MCNC: 31 MG/DL — HIGH (ref 7–23)
CALCIUM SERPL-MCNC: 9.3 MG/DL — SIGNIFICANT CHANGE UP (ref 8.5–10.1)
CHLORIDE SERPL-SCNC: 102 MMOL/L — SIGNIFICANT CHANGE UP (ref 96–108)
CO2 SERPL-SCNC: 29 MMOL/L — SIGNIFICANT CHANGE UP (ref 22–31)
CREAT SERPL-MCNC: 1.61 MG/DL — HIGH (ref 0.5–1.3)
D DIMER BLD IA.RAPID-MCNC: <150 NG/ML DDU — SIGNIFICANT CHANGE UP
EGFR: 33 ML/MIN/1.73M2 — LOW
EOSINOPHIL # BLD AUTO: 0.01 K/UL — SIGNIFICANT CHANGE UP (ref 0–0.5)
EOSINOPHIL NFR BLD AUTO: 0.1 % — SIGNIFICANT CHANGE UP (ref 0–6)
FLUAV AG NPH QL: SIGNIFICANT CHANGE UP
FLUBV AG NPH QL: SIGNIFICANT CHANGE UP
GLUCOSE SERPL-MCNC: 118 MG/DL — HIGH (ref 70–99)
HCT VFR BLD CALC: 40.9 % — SIGNIFICANT CHANGE UP (ref 34.5–45)
HGB BLD-MCNC: 13.3 G/DL — SIGNIFICANT CHANGE UP (ref 11.5–15.5)
IMM GRANULOCYTES NFR BLD AUTO: 0.5 % — SIGNIFICANT CHANGE UP (ref 0–1.5)
LIDOCAIN IGE QN: 181 U/L — SIGNIFICANT CHANGE UP (ref 73–393)
LYMPHOCYTES # BLD AUTO: 1.59 K/UL — SIGNIFICANT CHANGE UP (ref 1–3.3)
LYMPHOCYTES # BLD AUTO: 12.3 % — LOW (ref 13–44)
MCHC RBC-ENTMCNC: 29.6 PG — SIGNIFICANT CHANGE UP (ref 27–34)
MCHC RBC-ENTMCNC: 32.5 G/DL — SIGNIFICANT CHANGE UP (ref 32–36)
MCV RBC AUTO: 90.9 FL — SIGNIFICANT CHANGE UP (ref 80–100)
MONOCYTES # BLD AUTO: 1.36 K/UL — HIGH (ref 0–0.9)
MONOCYTES NFR BLD AUTO: 10.5 % — SIGNIFICANT CHANGE UP (ref 2–14)
NEUTROPHILS # BLD AUTO: 9.9 K/UL — HIGH (ref 1.8–7.4)
NEUTROPHILS NFR BLD AUTO: 76.4 % — SIGNIFICANT CHANGE UP (ref 43–77)
NRBC # BLD: 0 /100 WBCS — SIGNIFICANT CHANGE UP (ref 0–0)
NT-PROBNP SERPL-SCNC: 120 PG/ML — SIGNIFICANT CHANGE UP (ref 0–450)
PLATELET # BLD AUTO: 266 K/UL — SIGNIFICANT CHANGE UP (ref 150–400)
POTASSIUM SERPL-MCNC: 4.3 MMOL/L — SIGNIFICANT CHANGE UP (ref 3.5–5.3)
POTASSIUM SERPL-SCNC: 4.3 MMOL/L — SIGNIFICANT CHANGE UP (ref 3.5–5.3)
PROT SERPL-MCNC: 7.6 GM/DL — SIGNIFICANT CHANGE UP (ref 6–8.3)
RBC # BLD: 4.5 M/UL — SIGNIFICANT CHANGE UP (ref 3.8–5.2)
RBC # FLD: 13.7 % — SIGNIFICANT CHANGE UP (ref 10.3–14.5)
SARS-COV-2 RNA SPEC QL NAA+PROBE: DETECTED
SODIUM SERPL-SCNC: 137 MMOL/L — SIGNIFICANT CHANGE UP (ref 135–145)
TROPONIN I, HIGH SENSITIVITY RESULT: 6.9 NG/L — SIGNIFICANT CHANGE UP
WBC # BLD: 12.94 K/UL — HIGH (ref 3.8–10.5)
WBC # FLD AUTO: 12.94 K/UL — HIGH (ref 3.8–10.5)

## 2022-09-13 PROCEDURE — 99285 EMERGENCY DEPT VISIT HI MDM: CPT

## 2022-09-13 PROCEDURE — 71045 X-RAY EXAM CHEST 1 VIEW: CPT | Mod: 26

## 2022-09-13 PROCEDURE — 93010 ELECTROCARDIOGRAM REPORT: CPT

## 2022-09-13 RX ORDER — FAMOTIDINE 10 MG/ML
20 INJECTION INTRAVENOUS ONCE
Refills: 0 | Status: COMPLETED | OUTPATIENT
Start: 2022-09-13 | End: 2022-09-13

## 2022-09-13 RX ORDER — SODIUM CHLORIDE 9 MG/ML
1000 INJECTION INTRAMUSCULAR; INTRAVENOUS; SUBCUTANEOUS ONCE
Refills: 0 | Status: COMPLETED | OUTPATIENT
Start: 2022-09-13 | End: 2022-09-13

## 2022-09-13 RX ADMIN — FAMOTIDINE 20 MILLIGRAM(S): 10 INJECTION INTRAVENOUS at 20:07

## 2022-09-13 RX ADMIN — Medication 30 MILLILITER(S): at 20:07

## 2022-09-13 RX ADMIN — SODIUM CHLORIDE 1000 MILLILITER(S): 9 INJECTION INTRAMUSCULAR; INTRAVENOUS; SUBCUTANEOUS at 20:07

## 2022-09-13 RX ADMIN — SODIUM CHLORIDE 1000 MILLILITER(S): 9 INJECTION INTRAMUSCULAR; INTRAVENOUS; SUBCUTANEOUS at 21:00

## 2022-09-13 NOTE — ED PROVIDER NOTE - PATIENT PORTAL LINK FT
You can access the FollowMyHealth Patient Portal offered by [facility] by registering at the following website: [email or web address]. By joining [employer]’s FollowMyHealth portal, you will also be able to view your health information using other applications (apps) compatible with our system.

## 2022-09-13 NOTE — ED PROVIDER NOTE - OBJECTIVE STATEMENT
77F w/ PMH GERD, CAD, HLD accompanied by  pw substernal discomfort a/w fatigue, loss of appetite, belching, BOLAND onset today. Pt reports poor sleep last night (insomnia atypical for pt). 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 Pt noted slight BOLAND w/ speaking w/ pts, w/ bending over. Pt w/ lack of energy, lack of appetite, does not feel herself. Denies recent travel, sick contacts. Pt endorses increased family stress overall, but nothing in particular to trigger symptoms onset today. Endorses slight h/a. Denies F/C, vision change, dizziness, earache, sore throat, neck pain, CP, SOB, cough, abd pain, N/V/D/C, UTI sx, LE swelling. Pt seen in ED recently d/t pain behind L knee, diagnosed w/ Gilbert Cyst, negative DVT study. Pt saw Ortho yesterday and received steroid injection to L knee. Denies hx PE / DVT.     PMH as above, PSH none, NKDA, meds as listed.

## 2022-09-13 NOTE — ED PROVIDER NOTE - CLINICAL SUMMARY MEDICAL DECISION MAKING FREE TEXT BOX
77F w/ PMH GERD, CAD, HLD pw substernal CP a/w belching, fatigue, BOLAND, malaise onset today. AF, VSS. Well appearing, in NAD. Plan: CBC, CMP, lipase, trop, BNP, D-dimer, CXR, ECG. Give Pepcid, Maalox. Re-eval. 77F w/ PMH GERD, CAD, HLD pw substernal CP a/w belching, fatigue, BOLAND, malaise onset today. AF, VSS. Well appearing, in NAD. Plan: CBC, CMP, lipase, trop, BNP, D-dimer, CXR, ECG, Flu/COVID. Give Pepcid, Maalox. Re-eval.

## 2022-09-13 NOTE — ED ADULT NURSE NOTE - OBJECTIVE STATEMENT
Patient alert and oriented. Reports having chest pains that started today. Patient states she felt her chest hurting her. Patient states she is very anxious from family issues that has been going. Patient reports history of silent reflux. Patient took aspirin 325mg prior to coming to ER.  present at bedside.

## 2022-09-13 NOTE — ED PROVIDER NOTE - PROGRESS NOTE DETAILS
C+, pt reports C+ 4wks ago, unsure if new (+) v residual (+) has outpatient cardio for f/u COVID (+), pt reports C(+) 4wks ago, unsure if new (+) v residual (+). W/o w/u significant abnormalities other than mild leukocytosis to 12.9, Cr 1.6. On re-eval, resting comfortably, in NAD. Stable for d/c home. Instructed for close outpatient PCP, Cardio f/u. Return signs /  symptoms d/w pt,  at length. They understand / agree w/ this plan.

## 2022-09-13 NOTE — ED PROVIDER NOTE - NSFOLLOWUPCLINICS_GEN_ALL_ED_FT
A Cardiologist  Cardiology  .  [place]   Phone:   Fax:   Established Patient  Follow Up Time: 7-10 Days

## 2022-09-13 NOTE — ED ADULT NURSE REASSESSMENT NOTE - GENERAL PATIENT STATE
comfortable appearance/cooperative/family/SO at bedside
comfortable appearance/cooperative/family/SO at bedside/smiling/interactive

## 2022-09-13 NOTE — ED ADULT NURSE REASSESSMENT NOTE - NS ED NURSE REASSESS COMMENT FT1
Received report from rico RN. Pt AOx4, pt states chest pain/pressure improving. NAD noted, respirations equal and unlabored. Safety measures maintained.
Pt AOx4 and ambulatory with steady gait, v/s stable and respirations appear equal and unlabored. Pt denies any chest pain or discomfort at this time. Seen by MD, IV removed and, d/c paperwork signed.

## 2022-10-20 ENCOUNTER — APPOINTMENT (OUTPATIENT)
Dept: ORTHOPEDIC SURGERY | Facility: CLINIC | Age: 77
End: 2022-10-20

## 2022-10-31 ENCOUNTER — APPOINTMENT (OUTPATIENT)
Dept: CARDIOLOGY | Facility: CLINIC | Age: 77
End: 2022-10-31

## 2022-10-31 PROCEDURE — 93306 TTE W/DOPPLER COMPLETE: CPT

## 2022-11-13 RX ORDER — OMEPRAZOLE 40 MG/1
40 CAPSULE, DELAYED RELEASE ORAL
Qty: 90 | Refills: 0 | Status: ACTIVE | COMMUNITY
Start: 2022-07-01

## 2022-11-13 RX ORDER — ALPRAZOLAM 0.25 MG/1
0.25 TABLET ORAL
Qty: 30 | Refills: 0 | Status: ACTIVE | COMMUNITY
Start: 2022-09-29

## 2022-11-14 ENCOUNTER — APPOINTMENT (OUTPATIENT)
Dept: CARDIOLOGY | Facility: CLINIC | Age: 77
End: 2022-11-14

## 2022-11-14 ENCOUNTER — NON-APPOINTMENT (OUTPATIENT)
Age: 77
End: 2022-11-14

## 2022-11-14 VITALS
DIASTOLIC BLOOD PRESSURE: 70 MMHG | WEIGHT: 146 LBS | OXYGEN SATURATION: 97 % | SYSTOLIC BLOOD PRESSURE: 114 MMHG | BODY MASS INDEX: 26.7 KG/M2 | HEART RATE: 56 BPM

## 2022-11-14 PROCEDURE — 99214 OFFICE O/P EST MOD 30 MIN: CPT | Mod: 25

## 2022-11-14 PROCEDURE — 93000 ELECTROCARDIOGRAM COMPLETE: CPT

## 2022-11-14 NOTE — DISCUSSION/SUMMARY
[EKG obtained to assist in diagnosis and management of assessed problem(s)] : EKG obtained to assist in diagnosis and management of assessed problem(s) [FreeTextEntry1] : HTN - BP remains in normal range today; continue olmesartan 10 mg. qd. (two 5 mg tabs)\par \par HLD - continue atorvastatin.  Will check lipid and hepatic panel.\par \par CAD, non-occlusive on cardiac CTA 12/2019 at [place].  Remains asx., continue ASA and statin.\par \par Palpitations - correlated with isolated VPCs.  Recent echo with stable findings; only mild diastolic LV dysfunction seen but no significant structural abnormality.   \par \par She will continue on a low fat and low salt diet.\par \par She will return for a visit in 6 months.

## 2022-11-14 NOTE — ASSESSMENT
[FreeTextEntry1] : Palpitations, correlate with isolated VPCs.\par \par CAD, non-occlusive.  Cardiac CTA at [place] on 12/31/19 showed 25-50% ostial LAD dz.; no plaque detected in L main, L Circ or RCA\par \par HLD\par \par HTN

## 2022-11-14 NOTE — HISTORY OF PRESENT ILLNESS
[FreeTextEntry1] : She has suffered with palpitations for years.   In Dec. 2019, she went to the ED at [place] because of palps;  cardiac CTA showed a 25-50% ostial LAD lesion but no other dz.  Calcific changes were described in the thoracic aorta.  Troponins were normal.  Subsequent Holter monitor in early 2020 demonstrated 148 isolated ventricular premature beats, some as ventricular bigeminy.  225 isolated atrial premature beats were seen as well as one atrial couplet and one 5 beat run of atrial tachycardia.  Symptoms seemed to correlate to VPCs and  ventricular bigeminy.\par \par Echo on Oct. 31 showed minimal MI, PI and TR; normal LV EF was seen (60%) with mild diastolic LV dysfunction.\par \par As she returns today, she suffered from a Covid 19 infection in August.  From a cardiac standpoint, she has remained well.  With her activities, she describes no episodes of CP or BOLAND.  She describes no recent palps, and there have been no episodes of lightheadedness or loss of consciousness.

## 2022-11-15 LAB
ALBUMIN SERPL ELPH-MCNC: 4 G/DL
ALP BLD-CCNC: 74 U/L
ALT SERPL-CCNC: 17 U/L
AST SERPL-CCNC: 18 U/L
BILIRUB DIRECT SERPL-MCNC: 0.2 MG/DL
BILIRUB INDIRECT SERPL-MCNC: 0.3 MG/DL
BILIRUB SERPL-MCNC: 0.5 MG/DL
CHOLEST SERPL-MCNC: 179 MG/DL
HDLC SERPL-MCNC: 89 MG/DL
LDLC SERPL CALC-MCNC: 75 MG/DL
LDLC SERPL DIRECT ASSAY-MCNC: 79 MG/DL
NONHDLC SERPL-MCNC: 89 MG/DL
PROT SERPL-MCNC: 6.6 G/DL
TRIGL SERPL-MCNC: 74 MG/DL

## 2022-11-26 ENCOUNTER — APPOINTMENT (OUTPATIENT)
Dept: ORTHOPEDIC SURGERY | Facility: CLINIC | Age: 77
End: 2022-11-26

## 2022-11-26 DIAGNOSIS — M65.9 SYNOVITIS AND TENOSYNOVITIS, UNSPECIFIED: ICD-10-CM

## 2022-11-26 PROCEDURE — 73140 X-RAY EXAM OF FINGER(S): CPT | Mod: LT

## 2022-11-26 PROCEDURE — 99203 OFFICE O/P NEW LOW 30 MIN: CPT

## 2022-11-26 RX ORDER — METHYLPREDNISOLONE 4 MG/1
4 TABLET ORAL
Qty: 21 | Refills: 0 | Status: ACTIVE | COMMUNITY
Start: 2022-11-26 | End: 1900-01-01

## 2022-11-26 NOTE — PHYSICAL EXAM
[Metacarpal] : metacarpal [MCP Joint] : MCP joint [NL (75)] : Dorsiflexion 75 degrees [NL (85)] : volarflexion 85 degrees [NL (25)] : radial deviation 25 degrees [NL (40)] : ulnar deviation 40 degrees [NL (90)] : supination 90 degrees [3rd] : 3rd [MP Joint] : MP joint [PIP Joint] : PIP joint [5___] : pinch 5[unfilled]/5 [Left] : left hand [Degenerative change] : Degenerative change [] : no focal motor deficits

## 2022-11-26 NOTE — HISTORY OF PRESENT ILLNESS
[Left Arm] : left arm [Gradual] : gradual [Sudden] : sudden [6] : 6 [5] : 5 [Burning] : burning [Shooting] : shooting [Stabbing] : stabbing [Intermittent] : intermittent [Rest] : rest [Exercising] : exercising [de-identified] : 77 YF with left middle finger pain for a couple of weeks no injury.  Pain on the top of her finger and hand.  No injury.  No triggering. [] : Post Surgical Visit: no [FreeTextEntry1] : left middle finger  [FreeTextEntry5] : Pt has had a gradual onset of pain in her left middle finger with no injury, pt is having pain with bending and pulling, pt has a hx of a trigger finger release in the same hand, pt says she gets shooting pain up into her hand as well   [de-identified] : none

## 2022-11-26 NOTE — ASSESSMENT
[FreeTextEntry1] : She is prescribed a MDP.  Activities as tolerated.  f/u in 1 week with Dr Vallecillo if not better.

## 2022-12-13 ENCOUNTER — APPOINTMENT (OUTPATIENT)
Dept: ORTHOPEDIC SURGERY | Facility: CLINIC | Age: 77
End: 2022-12-13

## 2022-12-14 ENCOUNTER — NON-APPOINTMENT (OUTPATIENT)
Age: 77
End: 2022-12-14

## 2022-12-15 ENCOUNTER — RX RENEWAL (OUTPATIENT)
Age: 77
End: 2022-12-15

## 2023-04-14 ENCOUNTER — RX RENEWAL (OUTPATIENT)
Age: 78
End: 2023-04-14

## 2023-05-08 ENCOUNTER — APPOINTMENT (OUTPATIENT)
Dept: CARDIOLOGY | Facility: CLINIC | Age: 78
End: 2023-05-08
Payer: MEDICARE

## 2023-05-08 ENCOUNTER — NON-APPOINTMENT (OUTPATIENT)
Age: 78
End: 2023-05-08

## 2023-05-08 VITALS
WEIGHT: 140 LBS | OXYGEN SATURATION: 100 % | SYSTOLIC BLOOD PRESSURE: 118 MMHG | HEART RATE: 75 BPM | BODY MASS INDEX: 25.76 KG/M2 | DIASTOLIC BLOOD PRESSURE: 68 MMHG | HEIGHT: 62 IN

## 2023-05-08 PROCEDURE — 36415 COLL VENOUS BLD VENIPUNCTURE: CPT

## 2023-05-08 PROCEDURE — 99214 OFFICE O/P EST MOD 30 MIN: CPT | Mod: 25

## 2023-05-08 PROCEDURE — 93000 ELECTROCARDIOGRAM COMPLETE: CPT

## 2023-05-08 NOTE — ASSESSMENT
[FreeTextEntry1] : Palpitations, correlate with isolated VPCs.\par \par CAD, non-occlusive.  Cardiac CTA at [place] on 12/31/19 showed 25-50% ostial LAD dz.; no plaque detected in L main, L Circ or RCA\par \par HLD\par \par HTN

## 2023-05-08 NOTE — PHYSICAL EXAM
[General Appearance - Well Developed] : well developed [Normal Appearance] : normal appearance [Well Groomed] : well groomed [General Appearance - Well Nourished] : well nourished [No Deformities] : no deformities [General Appearance - In No Acute Distress] : no acute distress [Normal Conjunctiva] : the conjunctiva exhibited no abnormalities [Eyelids - No Xanthelasma] : the eyelids demonstrated no xanthelasmas [Normal Jugular Venous A Waves Present] : normal jugular venous A waves present [Normal Jugular Venous V Waves Present] : normal jugular venous V waves present [No Jugular Venous Gama A Waves] : no jugular venous gama A waves [Respiration, Rhythm And Depth] : normal respiratory rhythm and effort [Heart Rate And Rhythm] : heart rate and rhythm were normal [Auscultation Breath Sounds / Voice Sounds] : lungs were clear to auscultation bilaterally [Bowel Sounds] : normal bowel sounds [Abnormal Walk] : normal gait [Nail Clubbing] : no clubbing of the fingernails [Cyanosis, Localized] : no localized cyanosis [Skin Color & Pigmentation] : normal skin color and pigmentation [] : no rash [No Venous Stasis] : no venous stasis [Impaired Insight] : insight and judgment were intact [Oriented To Time, Place, And Person] : oriented to person, place, and time [Affect] : the affect was normal [Mood] : the mood was normal [FreeTextEntry1] : No bruit.  Soft, non-tender.  Liver and spleen not palpable; aortic pulsation not palpable.

## 2023-05-08 NOTE — DISCUSSION/SUMMARY
[EKG obtained to assist in diagnosis and management of assessed problem(s)] : EKG obtained to assist in diagnosis and management of assessed problem(s) [FreeTextEntry1] : HTN - BP remains in normal range today; continue olmesartan 10 mg. qd. (two 5 mg tabs).\par \par HLD - continue atorvastatin.  Will check lipid and hepatic panel.\par \par CAD, non-occlusive on cardiac CTA 12/2019 at [place].  Remains free of ischemia asx. - continue ASA and statin.\par \par Palpitations -  on prior Holter, sxs. correlated with isolated VPCs.  Echo last fall showed stable findings, with only mild diastolic LV dysfunction but no significant structural abnormality.   For now, will continue on current therapy; if sxs. persist, will arrange for an event monitor.\par \par She will continue on a low fat and low salt diet.\par \par She will return for a visit in 6 months.   Will arrange carotid Doppler at that time.

## 2023-05-08 NOTE — HISTORY OF PRESENT ILLNESS
[FreeTextEntry1] : She has suffered with palpitations for years.   In Dec. 2019, she went to the ED at [place] because of palps;  cardiac CTA showed a 25-50% ostial LAD lesion but no other dz.  Calcific changes were described in the thoracic aorta.  Troponins were normal.  Subsequent Holter monitor in early 2020 demonstrated 148 isolated ventricular premature beats, some as ventricular bigeminy.  225 isolated atrial premature beats were seen as well as one atrial couplet and one 5 beat run of atrial tachycardia.  Symptoms seemed to correlate to VPCs and  ventricular bigeminy.\par \par Echo on Oct. 31 showed minimal MI, PI and TR; normal LV EF was seen (60%) with mild diastolic LV dysfunction.\par \par As she returns today, she reports occasional palps.  They were more frequent last week, but seem to be lessening over the past 2 days or so.  They seems to occur as isolated premature beats.  She continues her usual activities and reports no episodes of CP or BOLAND.  There have been no episodes of lightheadedness or loss of consciousness.\par \par Medications are unchanged.

## 2023-05-10 LAB
ALBUMIN SERPL ELPH-MCNC: 4.2 G/DL
ALP BLD-CCNC: 64 U/L
ALT SERPL-CCNC: 11 U/L
AST SERPL-CCNC: 15 U/L
BILIRUB DIRECT SERPL-MCNC: 0.2 MG/DL
BILIRUB INDIRECT SERPL-MCNC: 0.4 MG/DL
BILIRUB SERPL-MCNC: 0.6 MG/DL
CHOLEST SERPL-MCNC: 187 MG/DL
CK SERPL-CCNC: 88 U/L
HDLC SERPL-MCNC: 98 MG/DL
LDLC SERPL CALC-MCNC: 78 MG/DL
LDLC SERPL DIRECT ASSAY-MCNC: 81 MG/DL
NONHDLC SERPL-MCNC: 90 MG/DL
PROT SERPL-MCNC: 6.7 G/DL
TRIGL SERPL-MCNC: 61 MG/DL

## 2023-06-05 ENCOUNTER — NON-APPOINTMENT (OUTPATIENT)
Age: 78
End: 2023-06-05

## 2023-06-22 ENCOUNTER — RX RENEWAL (OUTPATIENT)
Age: 78
End: 2023-06-22

## 2023-06-22 RX ORDER — ATORVASTATIN CALCIUM 20 MG/1
20 TABLET, FILM COATED ORAL
Qty: 100 | Refills: 3 | Status: ACTIVE | COMMUNITY
Start: 2023-06-22 | End: 1900-01-01

## 2023-06-22 RX ORDER — ATORVASTATIN CALCIUM 40 MG/1
40 TABLET, FILM COATED ORAL DAILY
Qty: 90 | Refills: 3 | Status: DISCONTINUED | COMMUNITY
Start: 2022-12-15 | End: 2023-06-22

## 2023-09-26 ENCOUNTER — NON-APPOINTMENT (OUTPATIENT)
Age: 78
End: 2023-09-26

## 2023-11-13 ENCOUNTER — APPOINTMENT (OUTPATIENT)
Dept: CARDIOLOGY | Facility: CLINIC | Age: 78
End: 2023-11-13
Payer: MEDICARE

## 2023-11-13 ENCOUNTER — NON-APPOINTMENT (OUTPATIENT)
Age: 78
End: 2023-11-13

## 2023-11-13 VITALS
HEART RATE: 60 BPM | BODY MASS INDEX: 25.79 KG/M2 | OXYGEN SATURATION: 100 % | SYSTOLIC BLOOD PRESSURE: 128 MMHG | WEIGHT: 141 LBS | DIASTOLIC BLOOD PRESSURE: 74 MMHG

## 2023-11-13 PROCEDURE — 99214 OFFICE O/P EST MOD 30 MIN: CPT | Mod: 25

## 2023-11-13 PROCEDURE — 93880 EXTRACRANIAL BILAT STUDY: CPT

## 2023-11-13 PROCEDURE — 93000 ELECTROCARDIOGRAM COMPLETE: CPT

## 2024-03-25 ENCOUNTER — NON-APPOINTMENT (OUTPATIENT)
Age: 79
End: 2024-03-25

## 2024-03-25 ENCOUNTER — APPOINTMENT (OUTPATIENT)
Dept: CARDIOLOGY | Facility: CLINIC | Age: 79
End: 2024-03-25
Payer: MEDICARE

## 2024-03-25 VITALS
BODY MASS INDEX: 26.13 KG/M2 | DIASTOLIC BLOOD PRESSURE: 70 MMHG | RESPIRATION RATE: 15 BRPM | WEIGHT: 142 LBS | HEART RATE: 71 BPM | SYSTOLIC BLOOD PRESSURE: 118 MMHG | HEIGHT: 62 IN | OXYGEN SATURATION: 98 %

## 2024-03-25 DIAGNOSIS — E78.5 HYPERLIPIDEMIA, UNSPECIFIED: ICD-10-CM

## 2024-03-25 DIAGNOSIS — I25.10 ATHEROSCLEROTIC HEART DISEASE OF NATIVE CORONARY ARTERY W/OUT ANGINA PECTORIS: ICD-10-CM

## 2024-03-25 DIAGNOSIS — I10 ESSENTIAL (PRIMARY) HYPERTENSION: ICD-10-CM

## 2024-03-25 DIAGNOSIS — R00.2 PALPITATIONS: ICD-10-CM

## 2024-03-25 PROCEDURE — 36415 COLL VENOUS BLD VENIPUNCTURE: CPT

## 2024-03-25 PROCEDURE — G2211 COMPLEX E/M VISIT ADD ON: CPT

## 2024-03-25 PROCEDURE — 93000 ELECTROCARDIOGRAM COMPLETE: CPT

## 2024-03-25 PROCEDURE — 99214 OFFICE O/P EST MOD 30 MIN: CPT

## 2024-03-25 NOTE — DISCUSSION/SUMMARY
[EKG obtained to assist in diagnosis and management of assessed problem(s)] : EKG obtained to assist in diagnosis and management of assessed problem(s) [FreeTextEntry1] : EKG today shows:  Sinus Rhythm at 67 bpm.  Normal intervals and axis.  Unremarkable tracing; no change.  PLAN: 1.  HTN - BP remains in normal range -  continue olmesartan 10 mg. qd. (two 5 mg tabs).  2.  CAD, non-occlusive on cardiac CTA 12/2019 at [place].  Remains free of ischemia asx. - -  continue ASA  -  continue atorvastatin 20 mg qd  3.  HLD -  -   Continue atorvastatin 20 mg a day  -   Will send out blood work to check a lipid profile, hepatic chemistries and a creatine kinase level.    4.  Palpitations -  on prior Holter, correlated with isolated VPCs.  Echo last fall showed stable findings, with only mild diastolic LV dysfunction but no significant structural abnormality.   -  mostly asx.; no specific Rx at this time.  5.  Continue low fat and low salt diet.  31 minutes spent on today's office visit.   She will return for a visit in 6 months.

## 2024-03-25 NOTE — HISTORY OF PRESENT ILLNESS
[FreeTextEntry1] : She has suffered with palpitations for years.   In Dec. 2019, she went to the ED at [place] because of palps;  cardiac CTA showed a 25-50% ostial LAD lesion but no other dz.  Calcific changes were described in the thoracic aorta.  Troponins were normal.  Subsequent Holter monitor in early 2020 demonstrated 148 isolated ventricular premature beats, some as ventricular bigeminy.  225 isolated atrial premature beats were seen as well as one atrial couplet and one 5 beat run of atrial tachycardia.  Symptoms seemed to correlate to VPCs and  ventricular bigeminy.  Echo on Oct. 31, 2022 showed minimal MI, PI and TR; normal LV EF was seen (60%) with mild diastolic LV dysfunction.  I spoke to Zahida by telephone in Sept.  At that time, she reported a tingling sensation in her legs as well as discomfort in the legs, particularly at nighttime. She stopped atorvastatin and felt that her symptoms improved somewhat.  In retrospect, creatine kinase levels were normal when checked. However, she has remained off the statin and does feel better with regard to the LE sxs.  She subsequently restarted it at a lesser dose of 20 mg daily, which she seems to be tolerating without problem.  As she returns today, she has been under increased stress, as her  is suffering from cervical stenosis and will likely need surgery for this to prevent progression of gait instability.  Zahida describes infrequent palpitations and no sustained arrhythmias.  There have been no episodes of CP or BOLAND.  She reports no episodes of lightheadedness or loss of consciousness.  There have been no new medical problems.

## 2024-03-25 NOTE — ASSESSMENT
[FreeTextEntry1] : ================================== Palpitations, correlate with isolated VPCs.  CAD, non-occlusive. Cardiac CTA at [place] on 12/31/19 showed 25-50% ostial LAD dz.; no plaque detected in L main, L Circ or RCA  HLD  HTN.

## 2024-03-26 LAB
ALBUMIN SERPL ELPH-MCNC: 4.4 G/DL
ALP BLD-CCNC: 66 U/L
ALT SERPL-CCNC: 13 U/L
AST SERPL-CCNC: 18 U/L
BILIRUB DIRECT SERPL-MCNC: 0.2 MG/DL
BILIRUB INDIRECT SERPL-MCNC: 0.4 MG/DL
BILIRUB SERPL-MCNC: 0.6 MG/DL
CHOLEST SERPL-MCNC: 170 MG/DL
CK SERPL-CCNC: 79 U/L
HDLC SERPL-MCNC: 92 MG/DL
LDLC SERPL CALC-MCNC: 68 MG/DL
LDLC SERPL DIRECT ASSAY-MCNC: 65 MG/DL
NONHDLC SERPL-MCNC: 77 MG/DL
PROT SERPL-MCNC: 6.9 G/DL
TRIGL SERPL-MCNC: 46 MG/DL

## 2024-03-27 NOTE — HISTORY OF PRESENT ILLNESS
[FreeTextEntry1] : She has suffered with palpitations for years; w/u years ago showed that isolated VPCs correlated with her sxs.  \par \par She was seen in the ED at [place] last December.  Cardiac CTA showed a 25-50% ostial LAD lesion but no other dz.  Calcific changes were described in the thoracic aorta.  Troponins were normal.  \par \par Earlier this year, a Holter monitor demonstrated 148 isolated ventricular premature beats, some as ventricular bigeminy.  225 isolated atrial premature beats were seen as well as one atrial couplet and one 5 beat run of atrial tachycardia.  Symptoms seemed to correlate to VPCs and  ventricular bigeminy.\par \par Since I saw her last in July,  she has been well.  She reports no recent palps since her last visit.  She reports no episodes of lightheadedness or loss of consciousness. There have been no episodes of CP or BOLAND.\par \par She continues Lipitor which she is tolerating without problem as well as low-dose aspirin.
39.1

## 2024-06-01 ENCOUNTER — RX RENEWAL (OUTPATIENT)
Age: 79
End: 2024-06-01

## 2024-06-02 RX ORDER — OLMESARTAN MEDOXOMIL 5 MG/1
5 TABLET, FILM COATED ORAL
Qty: 180 | Refills: 3 | Status: ACTIVE | COMMUNITY
Start: 2023-04-14 | End: 1900-01-01

## 2024-09-18 ENCOUNTER — RX RENEWAL (OUTPATIENT)
Age: 79
End: 2024-09-18

## 2024-09-23 ENCOUNTER — APPOINTMENT (OUTPATIENT)
Dept: CARDIOLOGY | Facility: CLINIC | Age: 79
End: 2024-09-23
Payer: MEDICARE

## 2024-09-23 ENCOUNTER — NON-APPOINTMENT (OUTPATIENT)
Age: 79
End: 2024-09-23

## 2024-09-23 VITALS
WEIGHT: 142 LBS | DIASTOLIC BLOOD PRESSURE: 70 MMHG | HEART RATE: 78 BPM | BODY MASS INDEX: 25.97 KG/M2 | SYSTOLIC BLOOD PRESSURE: 120 MMHG | OXYGEN SATURATION: 100 %

## 2024-09-23 DIAGNOSIS — E78.5 HYPERLIPIDEMIA, UNSPECIFIED: ICD-10-CM

## 2024-09-23 DIAGNOSIS — R00.2 PALPITATIONS: ICD-10-CM

## 2024-09-23 DIAGNOSIS — I10 ESSENTIAL (PRIMARY) HYPERTENSION: ICD-10-CM

## 2024-09-23 DIAGNOSIS — I25.10 ATHEROSCLEROTIC HEART DISEASE OF NATIVE CORONARY ARTERY W/OUT ANGINA PECTORIS: ICD-10-CM

## 2024-09-23 PROCEDURE — G2211 COMPLEX E/M VISIT ADD ON: CPT

## 2024-09-23 PROCEDURE — 99214 OFFICE O/P EST MOD 30 MIN: CPT

## 2024-09-23 PROCEDURE — 93000 ELECTROCARDIOGRAM COMPLETE: CPT

## 2024-09-23 NOTE — ASSESSMENT
[FreeTextEntry1] : ================================== Palpitations, correlate with isolated VPCs.  CAD, non-occlusive. Cardiac CTA at [place] on 12/31/19 showed 25-50% ostial LAD dz.; no plaque detected in L main, L Circ or RCA  HLD  HTN.

## 2024-09-23 NOTE — DISCUSSION/SUMMARY
[EKG obtained to assist in diagnosis and management of assessed problem(s)] : EKG obtained to assist in diagnosis and management of assessed problem(s) [FreeTextEntry1] : EKG today shows:  Sinus Rhythm at 66 bpm.  Normal intervals and axis.  Unremarkable tracing; no significant change.  PLAN: 1.  HTN - BP remains in normal range -  continue olmesartan.  2.  CAD, non-occlusive on cardiac CTA 12/2019 at [place].  Remains free of ischemia asx. - -  continue ASA  -  continue atorvastatin 20 mg qd -  will arrange sono of abd. aorta at next O.V. to screen for AAA  3.  HLD -  -   Continue atorvastatin 20 mg a day  -   gave her an Rx to have lipid profile, CMP and creatine kinase level checked, as she is going to lab tomorrow for other blood work.    4.  Palpitations -  on prior Holter, correlated with isolated VPCs.  Echo last fall showed stable findings, with only mild diastolic LV dysfunction but no significant structural abnormality.   -  mostly asx.; no specific Rx at this time.  5.  Continue low fat and low salt diet.  31 minutes spent on today's office visit.   She will return for a visit in 6 months with finesseo of abd. aorta.

## 2024-09-23 NOTE — HISTORY OF PRESENT ILLNESS
[FreeTextEntry1] : In Dec. 2019, she went to the ED at [facility] because of palps;  cardiac CTA showed a 25-50% ostial LAD lesion but no other dz.  Calcific changes were described in the thoracic aorta.  Troponins were normal.  Subsequent Holter monitor in early 2020 demonstrated 148 isolated ventricular premature beats, some as ventricular bigeminy.  225 isolated atrial premature beats were seen as well as one atrial couplet and one 5 beat run of atrial tachycardia.  Symptoms seemed to correlate to VPCs and  ventricular bigeminy.  Echo on Oct. 31, 2022 showed minimal MI, PI and TR; normal LV EF was seen (60%) with mild diastolic LV dysfunction.  I spoke to Zahida by telephone in Sept.  At that time, she reported a tingling sensation in her legs as well as discomfort in the legs, particularly at nighttime. She stopped atorvastatin and felt that her symptoms improved somewhat.  In retrospect, creatine kinase levels were normal when checked. However, she has remained off the statin and does feel better with regard to the LE sxs.  She subsequently restarted it at a lesser dose of 20 mg daily, which she seems to be tolerating without problem.  As she returns today, she has been well from a cardiac standpoint.  She reports only rare palpitations; there have been and no sustained arrhythmias.  She reports no episodes of CP or BOLAND.  There have been no episodes of lightheadedness or loss of consciousness.  She remains increased stress, as her  suffers from cervical stenosis and suffers falls as a consequence; unfortunately, he refuses to use a walker.     There have been no new medical problems.

## 2024-09-27 LAB
ALBUMIN SERPL ELPH-MCNC: 4.3 G/DL
ALP BLD-CCNC: 64 U/L
ALT SERPL-CCNC: 15 U/L
ANION GAP SERPL CALC-SCNC: 10 MMOL/L
AST SERPL-CCNC: 22 U/L
BILIRUB SERPL-MCNC: 0.4 MG/DL
BUN SERPL-MCNC: 23 MG/DL
CALCIUM SERPL-MCNC: 9.1 MG/DL
CHLORIDE SERPL-SCNC: 102 MMOL/L
CHOLEST SERPL-MCNC: 179 MG/DL
CK SERPL-CCNC: 159 U/L
CO2 SERPL-SCNC: 26 MMOL/L
CREAT SERPL-MCNC: 0.96 MG/DL
EGFR: 61 ML/MIN/1.73M2
GLUCOSE SERPL-MCNC: 148 MG/DL
HDLC SERPL-MCNC: 101 MG/DL
LDLC SERPL CALC-MCNC: 68 MG/DL
LDLC SERPL DIRECT ASSAY-MCNC: 72 MG/DL
NONHDLC SERPL-MCNC: 79 MG/DL
POTASSIUM SERPL-SCNC: 4.2 MMOL/L
PROT SERPL-MCNC: 6.7 G/DL
SODIUM SERPL-SCNC: 138 MMOL/L
TRIGL SERPL-MCNC: 53 MG/DL

## 2025-03-31 ENCOUNTER — APPOINTMENT (OUTPATIENT)
Dept: CARDIOLOGY | Facility: CLINIC | Age: 80
End: 2025-03-31
Payer: MEDICARE

## 2025-03-31 ENCOUNTER — NON-APPOINTMENT (OUTPATIENT)
Age: 80
End: 2025-03-31

## 2025-03-31 VITALS
BODY MASS INDEX: 26.16 KG/M2 | HEART RATE: 60 BPM | DIASTOLIC BLOOD PRESSURE: 60 MMHG | WEIGHT: 143 LBS | SYSTOLIC BLOOD PRESSURE: 106 MMHG | OXYGEN SATURATION: 97 %

## 2025-03-31 DIAGNOSIS — I10 ESSENTIAL (PRIMARY) HYPERTENSION: ICD-10-CM

## 2025-03-31 DIAGNOSIS — E78.5 HYPERLIPIDEMIA, UNSPECIFIED: ICD-10-CM

## 2025-03-31 DIAGNOSIS — I25.10 ATHEROSCLEROTIC HEART DISEASE OF NATIVE CORONARY ARTERY W/OUT ANGINA PECTORIS: ICD-10-CM

## 2025-03-31 DIAGNOSIS — R00.2 PALPITATIONS: ICD-10-CM

## 2025-03-31 PROCEDURE — 93000 ELECTROCARDIOGRAM COMPLETE: CPT

## 2025-03-31 PROCEDURE — G2211 COMPLEX E/M VISIT ADD ON: CPT

## 2025-03-31 PROCEDURE — 93978 VASCULAR STUDY: CPT

## 2025-03-31 PROCEDURE — 99214 OFFICE O/P EST MOD 30 MIN: CPT

## 2025-05-23 ENCOUNTER — RX RENEWAL (OUTPATIENT)
Age: 80
End: 2025-05-23

## 2025-06-09 ENCOUNTER — APPOINTMENT (OUTPATIENT)
Dept: CARDIOLOGY | Facility: CLINIC | Age: 80
End: 2025-06-09
Payer: MEDICARE

## 2025-06-09 PROCEDURE — 93015 CV STRESS TEST SUPVJ I&R: CPT

## 2025-06-09 PROCEDURE — A9500: CPT

## 2025-06-09 PROCEDURE — 78452 HT MUSCLE IMAGE SPECT MULT: CPT

## 2025-06-10 ENCOUNTER — NON-APPOINTMENT (OUTPATIENT)
Age: 80
End: 2025-06-10

## 2025-06-11 PROBLEM — R07.2 PRECORDIAL PAIN: Status: ACTIVE | Noted: 2025-06-11

## 2025-06-11 PROBLEM — R94.39 ABNORMAL NUCLEAR STRESS TEST: Status: ACTIVE | Noted: 2025-06-11

## 2025-06-24 ENCOUNTER — OUTPATIENT (OUTPATIENT)
Dept: OUTPATIENT SERVICES | Facility: HOSPITAL | Age: 80
LOS: 1 days | End: 2025-06-24
Payer: MEDICARE

## 2025-06-24 ENCOUNTER — TRANSCRIPTION ENCOUNTER (OUTPATIENT)
Age: 80
End: 2025-06-24

## 2025-06-24 VITALS
RESPIRATION RATE: 16 BRPM | HEART RATE: 70 BPM | DIASTOLIC BLOOD PRESSURE: 57 MMHG | WEIGHT: 139.99 LBS | SYSTOLIC BLOOD PRESSURE: 121 MMHG | TEMPERATURE: 97 F | HEIGHT: 62 IN | OXYGEN SATURATION: 100 %

## 2025-06-24 VITALS
DIASTOLIC BLOOD PRESSURE: 49 MMHG | SYSTOLIC BLOOD PRESSURE: 99 MMHG | OXYGEN SATURATION: 98 % | HEART RATE: 71 BPM | RESPIRATION RATE: 18 BRPM

## 2025-06-24 DIAGNOSIS — I25.10 ATHEROSCLEROTIC HEART DISEASE OF NATIVE CORONARY ARTERY WITHOUT ANGINA PECTORIS: ICD-10-CM

## 2025-06-24 LAB
ANION GAP SERPL CALC-SCNC: 12 MMOL/L — SIGNIFICANT CHANGE UP (ref 5–17)
BUN SERPL-MCNC: 29 MG/DL — HIGH (ref 7–23)
CALCIUM SERPL-MCNC: 9.3 MG/DL — SIGNIFICANT CHANGE UP (ref 8.4–10.5)
CHLORIDE SERPL-SCNC: 104 MMOL/L — SIGNIFICANT CHANGE UP (ref 96–108)
CO2 SERPL-SCNC: 23 MMOL/L — SIGNIFICANT CHANGE UP (ref 22–31)
CREAT SERPL-MCNC: 1.07 MG/DL — SIGNIFICANT CHANGE UP (ref 0.5–1.3)
EGFR: 53 ML/MIN/1.73M2 — LOW
EGFR: 53 ML/MIN/1.73M2 — LOW
GLUCOSE SERPL-MCNC: 101 MG/DL — HIGH (ref 70–99)
HCT VFR BLD CALC: 41.5 % — SIGNIFICANT CHANGE UP (ref 34.5–45)
HGB BLD-MCNC: 13.4 G/DL — SIGNIFICANT CHANGE UP (ref 11.5–15.5)
MCHC RBC-ENTMCNC: 30 PG — SIGNIFICANT CHANGE UP (ref 27–34)
MCHC RBC-ENTMCNC: 32.3 G/DL — SIGNIFICANT CHANGE UP (ref 32–36)
MCV RBC AUTO: 92.8 FL — SIGNIFICANT CHANGE UP (ref 80–100)
NRBC # BLD AUTO: 0 K/UL — SIGNIFICANT CHANGE UP (ref 0–0)
NRBC # FLD: 0 K/UL — SIGNIFICANT CHANGE UP (ref 0–0)
NRBC BLD AUTO-RTO: 0 /100 WBCS — SIGNIFICANT CHANGE UP (ref 0–0)
PLATELET # BLD AUTO: 202 K/UL — SIGNIFICANT CHANGE UP (ref 150–400)
PMV BLD: 9.7 FL — SIGNIFICANT CHANGE UP (ref 7–13)
POTASSIUM SERPL-MCNC: 4.3 MMOL/L — SIGNIFICANT CHANGE UP (ref 3.5–5.3)
POTASSIUM SERPL-SCNC: 4.3 MMOL/L — SIGNIFICANT CHANGE UP (ref 3.5–5.3)
RBC # BLD: 4.47 M/UL — SIGNIFICANT CHANGE UP (ref 3.8–5.2)
RBC # FLD: 13 % — SIGNIFICANT CHANGE UP (ref 10.3–14.5)
SODIUM SERPL-SCNC: 139 MMOL/L — SIGNIFICANT CHANGE UP (ref 135–145)
WBC # BLD: 5.88 K/UL — SIGNIFICANT CHANGE UP (ref 3.8–10.5)
WBC # FLD AUTO: 5.88 K/UL — SIGNIFICANT CHANGE UP (ref 3.8–10.5)

## 2025-06-24 PROCEDURE — 93005 ELECTROCARDIOGRAM TRACING: CPT

## 2025-06-24 PROCEDURE — 99152 MOD SED SAME PHYS/QHP 5/>YRS: CPT

## 2025-06-24 PROCEDURE — 93010 ELECTROCARDIOGRAM REPORT: CPT

## 2025-06-24 PROCEDURE — C1887: CPT

## 2025-06-24 PROCEDURE — 93454 CORONARY ARTERY ANGIO S&I: CPT

## 2025-06-24 PROCEDURE — C1769: CPT

## 2025-06-24 PROCEDURE — 80048 BASIC METABOLIC PNL TOTAL CA: CPT

## 2025-06-24 PROCEDURE — C1894: CPT

## 2025-06-24 PROCEDURE — 85027 COMPLETE CBC AUTOMATED: CPT

## 2025-06-24 PROCEDURE — 93454 CORONARY ARTERY ANGIO S&I: CPT | Mod: 26

## 2025-06-24 RX ADMIN — Medication 250 MILLILITER(S): at 09:08

## 2025-06-24 RX ADMIN — Medication 75 MILLILITER(S): at 09:08

## 2025-06-24 NOTE — H&P CARDIOLOGY - HISTORY OF PRESENT ILLNESS
80F w/ PMH GERD, CAD, HLD presents today for cardiac angiogram. Patient reports chest and epigastric pain. Patient evaluated by Dr Giles- pt underwent NST which demonstrates anterior perfusion defect and had prior CTA (LAD dx 25-50%). Patient referred for [facility] for further ischemic evaluation  80F w/ PMH GERD, CAD, HLD presents today for cardiac angiogram. Patient reports midsternal chest and epigastric pain associated with belching. Patient evaluated by Dr Giles- pt underwent NST which demonstrates anterior perfusion defect and had prior CTA (LAD dx 25-50%). Patient referred for [facility] for further ischemic evaluation

## 2025-06-24 NOTE — ASU DISCHARGE PLAN (ADULT/PEDIATRIC) - CARE PROVIDER_API CALL
Benjamin Giles  Cardiovascular Disease  [address] [facility], [facility] [address]  [place] [address]  Phone: [phone]  Fax: [phone]  Established Patient  Follow Up Time: 2 weeks

## 2025-06-24 NOTE — ASU PATIENT PROFILE, ADULT - FALL HARM RISK - UNIVERSAL INTERVENTIONS
Bed in lowest position, wheels locked, appropriate side rails in place/Call bell, personal items and telephone in reach/Instruct patient to call for assistance before getting out of bed or chair/Non-slip footwear when patient is out of bed/[place] to call system/Physically safe environment - no spills, clutter or unnecessary equipment/Purposeful Proactive Rounding/Room/bathroom lighting operational, light cord in reach

## 2025-06-24 NOTE — ASU DISCHARGE PLAN (ADULT/PEDIATRIC) - ASU DC SPECIAL INSTRUCTIONSFT

## 2025-06-25 ENCOUNTER — TRANSCRIPTION ENCOUNTER (OUTPATIENT)
Age: 80
End: 2025-06-25

## 2025-07-24 ENCOUNTER — APPOINTMENT (OUTPATIENT)
Dept: ORTHOPEDIC SURGERY | Facility: CLINIC | Age: 80
End: 2025-07-24
Payer: MEDICARE

## 2025-07-24 DIAGNOSIS — G56.02 CARPAL TUNNEL SYNDROME, LEFT UPPER LIMB: ICD-10-CM

## 2025-07-24 PROCEDURE — 99204 OFFICE O/P NEW MOD 45 MIN: CPT

## 2025-09-06 ENCOUNTER — APPOINTMENT (OUTPATIENT)
Dept: ORTHOPEDIC SURGERY | Facility: CLINIC | Age: 80
End: 2025-09-06
Payer: MEDICARE

## 2025-09-06 VITALS — WEIGHT: 140 LBS | BODY MASS INDEX: 25.76 KG/M2 | HEIGHT: 62 IN

## 2025-09-06 DIAGNOSIS — M17.12 UNILATERAL PRIMARY OSTEOARTHRITIS, LEFT KNEE: ICD-10-CM

## 2025-09-06 DIAGNOSIS — M25.562 PAIN IN LEFT KNEE: ICD-10-CM

## 2025-09-06 PROCEDURE — 99203 OFFICE O/P NEW LOW 30 MIN: CPT | Mod: 25

## 2025-09-06 PROCEDURE — 20610 DRAIN/INJ JOINT/BURSA W/O US: CPT | Mod: LT

## 2025-09-06 PROCEDURE — 73562 X-RAY EXAM OF KNEE 3: CPT | Mod: LT

## 2025-09-18 ENCOUNTER — APPOINTMENT (OUTPATIENT)
Dept: ORTHOPEDIC SURGERY | Facility: CLINIC | Age: 80
End: 2025-09-18
Payer: MEDICARE

## 2025-09-18 VITALS — WEIGHT: 140 LBS | BODY MASS INDEX: 25.76 KG/M2 | HEIGHT: 62 IN

## 2025-09-18 DIAGNOSIS — M17.12 UNILATERAL PRIMARY OSTEOARTHRITIS, LEFT KNEE: ICD-10-CM

## 2025-09-18 DIAGNOSIS — M65.969 UNSP SYNOVITIS AND TENOSYNOVITIS, UNSPECIFIED LOWER LEG: ICD-10-CM

## 2025-09-18 DIAGNOSIS — M54.16 RADICULOPATHY, LUMBAR REGION: ICD-10-CM

## 2025-09-18 PROCEDURE — 99203 OFFICE O/P NEW LOW 30 MIN: CPT

## 2025-09-18 RX ORDER — MELOXICAM 15 MG/1
15 TABLET ORAL
Qty: 30 | Refills: 0 | Status: ACTIVE | COMMUNITY
Start: 2025-09-18 | End: 2025-10-18

## 2025-09-20 PROBLEM — I49.3 VPC'S (VENTRICULAR PREMATURE COMPLEXES): Status: ACTIVE | Noted: 2025-09-20
